# Patient Record
Sex: MALE | Race: WHITE | NOT HISPANIC OR LATINO
[De-identification: names, ages, dates, MRNs, and addresses within clinical notes are randomized per-mention and may not be internally consistent; named-entity substitution may affect disease eponyms.]

---

## 2017-01-06 ENCOUNTER — OTHER (OUTPATIENT)
Age: 67
End: 2017-01-06

## 2017-01-07 ENCOUNTER — APPOINTMENT (OUTPATIENT)
Dept: ORTHOPEDIC SURGERY | Facility: CLINIC | Age: 67
End: 2017-01-07

## 2017-01-07 VITALS
TEMPERATURE: 98.2 F | SYSTOLIC BLOOD PRESSURE: 146 MMHG | BODY MASS INDEX: 44.38 KG/M2 | HEART RATE: 8 BPM | WEIGHT: 310 LBS | HEIGHT: 70 IN | DIASTOLIC BLOOD PRESSURE: 82 MMHG

## 2017-01-20 ENCOUNTER — APPOINTMENT (OUTPATIENT)
Dept: ORTHOPEDIC SURGERY | Facility: CLINIC | Age: 67
End: 2017-01-20

## 2017-02-13 ENCOUNTER — APPOINTMENT (OUTPATIENT)
Dept: ORTHOPEDIC SURGERY | Facility: CLINIC | Age: 67
End: 2017-02-13

## 2017-02-13 DIAGNOSIS — Z47.1 AFTERCARE FOLLOWING JOINT REPLACEMENT SURGERY: ICD-10-CM

## 2017-02-13 DIAGNOSIS — Z96.651 PRESENCE OF RIGHT ARTIFICIAL KNEE JOINT: ICD-10-CM

## 2018-04-11 ENCOUNTER — APPOINTMENT (OUTPATIENT)
Dept: SURGICAL ONCOLOGY | Facility: CLINIC | Age: 68
End: 2018-04-11
Payer: COMMERCIAL

## 2018-04-11 VITALS
SYSTOLIC BLOOD PRESSURE: 145 MMHG | HEIGHT: 70 IN | BODY MASS INDEX: 45.1 KG/M2 | DIASTOLIC BLOOD PRESSURE: 80 MMHG | WEIGHT: 315 LBS | HEART RATE: 92 BPM | RESPIRATION RATE: 98 BRPM

## 2018-04-11 DIAGNOSIS — Z86.73 PERSONAL HISTORY OF TRANSIENT ISCHEMIC ATTACK (TIA), AND CEREBRAL INFARCTION W/OUT RESIDUAL DEFICITS: ICD-10-CM

## 2018-04-11 DIAGNOSIS — Z87.09 PERSONAL HISTORY OF OTHER DISEASES OF THE RESPIRATORY SYSTEM: ICD-10-CM

## 2018-04-11 PROCEDURE — 99245 OFF/OP CONSLTJ NEW/EST HI 55: CPT

## 2018-04-11 RX ORDER — ALBUTEROL SULFATE 90 UG/1
INHALANT RESPIRATORY (INHALATION)
Refills: 0 | Status: ACTIVE | COMMUNITY

## 2018-04-11 RX ORDER — FLUTICASONE FUROATE AND VILANTEROL TRIFENATATE 200; 25 UG/1; UG/1
200-25 POWDER RESPIRATORY (INHALATION)
Refills: 0 | Status: ACTIVE | COMMUNITY

## 2018-04-17 ENCOUNTER — FORM ENCOUNTER (OUTPATIENT)
Age: 68
End: 2018-04-17

## 2018-04-18 ENCOUNTER — OUTPATIENT (OUTPATIENT)
Dept: OUTPATIENT SERVICES | Facility: HOSPITAL | Age: 68
LOS: 1 days | End: 2018-04-18

## 2018-04-18 ENCOUNTER — APPOINTMENT (OUTPATIENT)
Dept: MRI IMAGING | Facility: CLINIC | Age: 68
End: 2018-04-18
Payer: COMMERCIAL

## 2018-04-18 DIAGNOSIS — K76.9 LIVER DISEASE, UNSPECIFIED: ICD-10-CM

## 2018-04-18 PROCEDURE — 74183 MRI ABD W/O CNTR FLWD CNTR: CPT | Mod: 26

## 2018-04-24 PROBLEM — K76.9 HEPATIC LESION: Status: ACTIVE | Noted: 2018-04-11

## 2018-04-25 ENCOUNTER — APPOINTMENT (OUTPATIENT)
Dept: SURGICAL ONCOLOGY | Facility: CLINIC | Age: 68
End: 2018-04-25
Payer: COMMERCIAL

## 2018-04-25 VITALS
SYSTOLIC BLOOD PRESSURE: 133 MMHG | HEART RATE: 105 BPM | TEMPERATURE: 98.1 F | BODY MASS INDEX: 44.52 KG/M2 | DIASTOLIC BLOOD PRESSURE: 75 MMHG | WEIGHT: 311 LBS | OXYGEN SATURATION: 98 % | HEIGHT: 70 IN

## 2018-04-25 DIAGNOSIS — K76.9 LIVER DISEASE, UNSPECIFIED: ICD-10-CM

## 2018-04-25 PROCEDURE — 99215 OFFICE O/P EST HI 40 MIN: CPT

## 2018-04-30 ENCOUNTER — OUTPATIENT (OUTPATIENT)
Dept: OUTPATIENT SERVICES | Facility: HOSPITAL | Age: 68
LOS: 1 days | End: 2018-04-30
Payer: COMMERCIAL

## 2018-04-30 VITALS
HEIGHT: 70 IN | HEART RATE: 88 BPM | RESPIRATION RATE: 16 BRPM | DIASTOLIC BLOOD PRESSURE: 79 MMHG | WEIGHT: 315 LBS | TEMPERATURE: 98 F | SYSTOLIC BLOOD PRESSURE: 133 MMHG

## 2018-04-30 DIAGNOSIS — Z01.818 ENCOUNTER FOR OTHER PREPROCEDURAL EXAMINATION: ICD-10-CM

## 2018-04-30 DIAGNOSIS — I10 ESSENTIAL (PRIMARY) HYPERTENSION: ICD-10-CM

## 2018-04-30 DIAGNOSIS — Z29.9 ENCOUNTER FOR PROPHYLACTIC MEASURES, UNSPECIFIED: ICD-10-CM

## 2018-04-30 DIAGNOSIS — K76.9 LIVER DISEASE, UNSPECIFIED: ICD-10-CM

## 2018-04-30 DIAGNOSIS — Z98.890 OTHER SPECIFIED POSTPROCEDURAL STATES: Chronic | ICD-10-CM

## 2018-04-30 DIAGNOSIS — E11.9 TYPE 2 DIABETES MELLITUS WITHOUT COMPLICATIONS: ICD-10-CM

## 2018-04-30 LAB
ANION GAP SERPL CALC-SCNC: 13 MMOL/L — SIGNIFICANT CHANGE UP (ref 5–17)
APTT BLD: 32.5 SEC — SIGNIFICANT CHANGE UP (ref 27.5–37.4)
BLD GP AB SCN SERPL QL: SIGNIFICANT CHANGE UP
BUN SERPL-MCNC: 22 MG/DL — HIGH (ref 8–20)
CALCIUM SERPL-MCNC: 9 MG/DL — SIGNIFICANT CHANGE UP (ref 8.6–10.2)
CHLORIDE SERPL-SCNC: 104 MMOL/L — SIGNIFICANT CHANGE UP (ref 98–107)
CO2 SERPL-SCNC: 26 MMOL/L — SIGNIFICANT CHANGE UP (ref 22–29)
CREAT SERPL-MCNC: 0.92 MG/DL — SIGNIFICANT CHANGE UP (ref 0.5–1.3)
GLUCOSE SERPL-MCNC: 115 MG/DL — SIGNIFICANT CHANGE UP (ref 70–115)
HBA1C BLD-MCNC: 6.4 % — HIGH (ref 4–5.6)
HCT VFR BLD CALC: 36.6 % — LOW (ref 42–52)
HGB BLD-MCNC: 11.8 G/DL — LOW (ref 14–18)
INR BLD: 1.04 RATIO — SIGNIFICANT CHANGE UP (ref 0.88–1.16)
MCHC RBC-ENTMCNC: 27.2 PG — SIGNIFICANT CHANGE UP (ref 27–31)
MCHC RBC-ENTMCNC: 32.2 G/DL — SIGNIFICANT CHANGE UP (ref 32–36)
MCV RBC AUTO: 84.3 FL — SIGNIFICANT CHANGE UP (ref 80–94)
PLATELET # BLD AUTO: 91 K/UL — LOW (ref 150–400)
POTASSIUM SERPL-MCNC: 4.6 MMOL/L — SIGNIFICANT CHANGE UP (ref 3.5–5.3)
POTASSIUM SERPL-SCNC: 4.6 MMOL/L — SIGNIFICANT CHANGE UP (ref 3.5–5.3)
PROTHROM AB SERPL-ACNC: 11.4 SEC — SIGNIFICANT CHANGE UP (ref 9.8–12.7)
RBC # BLD: 4.34 M/UL — LOW (ref 4.6–6.2)
RBC # FLD: 15.8 % — HIGH (ref 11–15.6)
SODIUM SERPL-SCNC: 143 MMOL/L — SIGNIFICANT CHANGE UP (ref 135–145)
TYPE + AB SCN PNL BLD: SIGNIFICANT CHANGE UP
WBC # BLD: 2.9 K/UL — LOW (ref 4.8–10.8)
WBC # FLD AUTO: 2.9 K/UL — LOW (ref 4.8–10.8)

## 2018-04-30 PROCEDURE — 86901 BLOOD TYPING SEROLOGIC RH(D): CPT

## 2018-04-30 PROCEDURE — 85027 COMPLETE CBC AUTOMATED: CPT

## 2018-04-30 PROCEDURE — G0463: CPT

## 2018-04-30 PROCEDURE — 36415 COLL VENOUS BLD VENIPUNCTURE: CPT

## 2018-04-30 PROCEDURE — 83036 HEMOGLOBIN GLYCOSYLATED A1C: CPT

## 2018-04-30 PROCEDURE — 86850 RBC ANTIBODY SCREEN: CPT

## 2018-04-30 PROCEDURE — 86900 BLOOD TYPING SEROLOGIC ABO: CPT

## 2018-04-30 PROCEDURE — 93005 ELECTROCARDIOGRAM TRACING: CPT

## 2018-04-30 PROCEDURE — 85730 THROMBOPLASTIN TIME PARTIAL: CPT

## 2018-04-30 PROCEDURE — 80048 BASIC METABOLIC PNL TOTAL CA: CPT

## 2018-04-30 PROCEDURE — 93010 ELECTROCARDIOGRAM REPORT: CPT

## 2018-04-30 PROCEDURE — 85610 PROTHROMBIN TIME: CPT

## 2018-04-30 NOTE — H&P PST ADULT - FAMILY HISTORY
Mother  Still living? Unknown  Family history of brain aneurysm, Age at diagnosis: Age Unknown     Father  Still living? No  Family history of diabetes mellitus, Age at diagnosis: Age Unknown

## 2018-04-30 NOTE — H&P PST ADULT - HISTORY OF PRESENT ILLNESS
This is a 67 y.o male who presents to PST today.  The pt reports he has a history of Hepatitis C and was being evaluated by Dr. Ng who specializes in this illness.  He underwent an abdominal cat scan which demonstrated a mass on the liver.  He is now scheduled for resection of same in the near future.
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
## 2018-04-30 NOTE — H&P PST ADULT - PSH
H/O right knee surgery  Right knee total joint replacement 3/2015  4/2015 Arthroscopic right knee surgery  7/2015 removal of artificial joint and spacer placement  History of total left knee replacement    Previous back surgery    S/P arthroscopy of left knee    S/P eye surgery  Laser  S/P eye surgery  laser  S/P hernia surgery  umbilical  S/P knee replacement  Rt  S/P rotator cuff surgery  R shoulder  S/P shoulder surgery  x 2

## 2018-04-30 NOTE — PATIENT PROFILE ADULT. - ABILITY TO HEAR (WITH HEARING AID OR HEARING APPLIANCE IF NORMALLY USED):
hearing aides both ears/Mildly to Moderately Impaired: difficulty hearing in some environments or speaker may need to increase volume or speak distinctly

## 2018-04-30 NOTE — H&P PST ADULT - ASSESSMENT
CAPRINI SCORE [CLOT]    AGE RELATED RISK FACTORS                                                       MOBILITY RELATED FACTORS  [ ] Age 41-60 years                                            (1 Point)                  [ ] Bed rest                                                        (1 Point)  [x] Age: 61-74 years                                           (2 Points)                 [ ] Plaster cast                                                   (2 Points)  [ ] Age= 75 years                                              (3 Points)                 [ ] Bed bound for more than 72 hours                 (2 Points)    DISEASE RELATED RISK FACTORS                                               GENDER SPECIFIC FACTORS  [x ] Edema in the lower extremities                       (1 Point)                  [ ] Pregnancy                                                     (1 Point)  [ ] Varicose veins                                               (1 Point)                  [ ] Post-partum < 6 weeks                                   (1 Point)             [x ] BMI > 25 Kg/m2                                            (1 Point)                  [ ] Hormonal therapy  or oral contraception          (1 Point)                 [ ] Sepsis (in the previous month)                        (1 Point)                  [ ] History of pregnancy complications                 (1 point)  [ ] Pneumonia or serious lung disease                                               [ ] Unexplained or recurrent                     (1 Point)           (in the previous month)                               (1 Point)  [ ] Abnormal pulmonary function test                     (1 Point)                 SURGERY RELATED RISK FACTORS  [ ] Acute myocardial infarction                              (1 Point)                 [ ]  Section                                             (1 Point)  [ ] Congestive heart failure (in the previous month)  (1 Point)               [ ] Minor surgery                                                  (1 Point)   [ ] Inflammatory bowel disease                             (1 Point)                 [ ] Arthroscopic surgery                                        (2 Points)  [ ] Central venous access                                      (2 Points)                [x ] General surgery lasting more than 45 minutes   (2 Points)       [ ] Stroke (in the previous month)                          (5 Points)               [ ] Elective arthroplasty                                         (5 Points)                                                                                                                                               HEMATOLOGY RELATED FACTORS                                                 TRAUMA RELATED RISK FACTORS  [ ] Prior episodes of VTE                                     (3 Points)                 [ ] Fracture of the hip, pelvis, or leg                       (5 Points)  [ ] Positive family history for VTE                         (3 Points)                 [ ] Acute spinal cord injury (in the previous month)  (5 Points)  [ ] Prothrombin 26996 A                                     (3 Points)                 [ ] Paralysis  (less than 1 month)                             (5 Points)  [ ] Factor V Leiden                                             (3 Points)                  [ ] Multiple Trauma within 1 month                        (5 Points)  [ ] Lupus anticoagulants                                     (3 Points)                                                           [ ] Anticardiolipin antibodies                               (3 Points)                                                       [ ] High homocysteine in the blood                      (3 Points)                                             [ ] Other congenital or acquired thrombophilia      (3 Points)                                                [ ] Heparin induced thrombocytopenia                  (3 Points)                                          Total Score [    6      ]

## 2018-05-14 ENCOUNTER — APPOINTMENT (OUTPATIENT)
Dept: SURGICAL ONCOLOGY | Facility: HOSPITAL | Age: 68
End: 2018-05-14

## 2018-05-29 ENCOUNTER — OUTPATIENT (OUTPATIENT)
Dept: OUTPATIENT SERVICES | Facility: HOSPITAL | Age: 68
LOS: 1 days | End: 2018-05-29
Payer: COMMERCIAL

## 2018-05-29 VITALS
WEIGHT: 315 LBS | HEART RATE: 80 BPM | RESPIRATION RATE: 20 BRPM | TEMPERATURE: 97 F | SYSTOLIC BLOOD PRESSURE: 142 MMHG | HEIGHT: 69 IN | DIASTOLIC BLOOD PRESSURE: 70 MMHG

## 2018-05-29 DIAGNOSIS — K76.9 LIVER DISEASE, UNSPECIFIED: ICD-10-CM

## 2018-05-29 DIAGNOSIS — E11.9 TYPE 2 DIABETES MELLITUS WITHOUT COMPLICATIONS: ICD-10-CM

## 2018-05-29 DIAGNOSIS — Z29.9 ENCOUNTER FOR PROPHYLACTIC MEASURES, UNSPECIFIED: ICD-10-CM

## 2018-05-29 DIAGNOSIS — I10 ESSENTIAL (PRIMARY) HYPERTENSION: ICD-10-CM

## 2018-05-29 DIAGNOSIS — Z98.890 OTHER SPECIFIED POSTPROCEDURAL STATES: Chronic | ICD-10-CM

## 2018-05-29 DIAGNOSIS — Z01.818 ENCOUNTER FOR OTHER PREPROCEDURAL EXAMINATION: ICD-10-CM

## 2018-05-29 LAB
ALBUMIN SERPL ELPH-MCNC: 4 G/DL — SIGNIFICANT CHANGE UP (ref 3.3–5.2)
ALP SERPL-CCNC: 119 U/L — SIGNIFICANT CHANGE UP (ref 40–120)
ALT FLD-CCNC: 81 U/L — HIGH
ANION GAP SERPL CALC-SCNC: 13 MMOL/L — SIGNIFICANT CHANGE UP (ref 5–17)
APTT BLD: 33.9 SEC — SIGNIFICANT CHANGE UP (ref 27.5–37.4)
AST SERPL-CCNC: 84 U/L — HIGH
BASOPHILS # BLD AUTO: 0 K/UL — SIGNIFICANT CHANGE UP (ref 0–0.2)
BASOPHILS NFR BLD AUTO: 0.2 % — SIGNIFICANT CHANGE UP (ref 0–2)
BILIRUB SERPL-MCNC: 0.5 MG/DL — SIGNIFICANT CHANGE UP (ref 0.4–2)
BLD GP AB SCN SERPL QL: SIGNIFICANT CHANGE UP
BUN SERPL-MCNC: 22 MG/DL — HIGH (ref 8–20)
CALCIUM SERPL-MCNC: 9.6 MG/DL — SIGNIFICANT CHANGE UP (ref 8.6–10.2)
CHLORIDE SERPL-SCNC: 102 MMOL/L — SIGNIFICANT CHANGE UP (ref 98–107)
CO2 SERPL-SCNC: 25 MMOL/L — SIGNIFICANT CHANGE UP (ref 22–29)
CREAT SERPL-MCNC: 0.9 MG/DL — SIGNIFICANT CHANGE UP (ref 0.5–1.3)
EOSINOPHIL # BLD AUTO: 0.1 K/UL — SIGNIFICANT CHANGE UP (ref 0–0.5)
EOSINOPHIL NFR BLD AUTO: 2.4 % — SIGNIFICANT CHANGE UP (ref 0–6)
GLUCOSE SERPL-MCNC: 156 MG/DL — HIGH (ref 70–115)
HCT VFR BLD CALC: 38.3 % — LOW (ref 42–52)
HGB BLD-MCNC: 12.3 G/DL — LOW (ref 14–18)
INR BLD: 1.06 RATIO — SIGNIFICANT CHANGE UP (ref 0.88–1.16)
LYMPHOCYTES # BLD AUTO: 0.7 K/UL — LOW (ref 1–4.8)
LYMPHOCYTES # BLD AUTO: 16.7 % — LOW (ref 20–55)
MCHC RBC-ENTMCNC: 27 PG — SIGNIFICANT CHANGE UP (ref 27–31)
MCHC RBC-ENTMCNC: 32.1 G/DL — SIGNIFICANT CHANGE UP (ref 32–36)
MCV RBC AUTO: 84.2 FL — SIGNIFICANT CHANGE UP (ref 80–94)
MONOCYTES # BLD AUTO: 0.3 K/UL — SIGNIFICANT CHANGE UP (ref 0–0.8)
MONOCYTES NFR BLD AUTO: 6.6 % — SIGNIFICANT CHANGE UP (ref 3–10)
NEUTROPHILS # BLD AUTO: 3 K/UL — SIGNIFICANT CHANGE UP (ref 1.8–8)
NEUTROPHILS NFR BLD AUTO: 73.9 % — HIGH (ref 37–73)
PLATELET # BLD AUTO: 102 K/UL — LOW (ref 150–400)
POTASSIUM SERPL-MCNC: 4.9 MMOL/L — SIGNIFICANT CHANGE UP (ref 3.5–5.3)
POTASSIUM SERPL-SCNC: 4.9 MMOL/L — SIGNIFICANT CHANGE UP (ref 3.5–5.3)
PROT SERPL-MCNC: 7.2 G/DL — SIGNIFICANT CHANGE UP (ref 6.6–8.7)
PROTHROM AB SERPL-ACNC: 11.7 SEC — SIGNIFICANT CHANGE UP (ref 9.8–12.7)
RBC # BLD: 4.55 M/UL — LOW (ref 4.6–6.2)
RBC # FLD: 16 % — HIGH (ref 11–15.6)
SODIUM SERPL-SCNC: 140 MMOL/L — SIGNIFICANT CHANGE UP (ref 135–145)
TYPE + AB SCN PNL BLD: SIGNIFICANT CHANGE UP
WBC # BLD: 4.1 K/UL — LOW (ref 4.8–10.8)
WBC # FLD AUTO: 4.1 K/UL — LOW (ref 4.8–10.8)

## 2018-05-29 RX ORDER — SODIUM CHLORIDE 9 MG/ML
3 INJECTION INTRAMUSCULAR; INTRAVENOUS; SUBCUTANEOUS EVERY 8 HOURS
Qty: 0 | Refills: 0 | Status: DISCONTINUED | OUTPATIENT
Start: 2018-06-11 | End: 2018-06-14

## 2018-05-29 RX ORDER — FLUTICASONE FUROATE AND VILANTEROL TRIFENATATE 100; 25 UG/1; UG/1
0 POWDER RESPIRATORY (INHALATION)
Qty: 0 | Refills: 0 | COMMUNITY

## 2018-05-29 NOTE — PATIENT PROFILE ADULT. - FAMILY HISTORY
Mother  Still living? Unknown  Family history of brain aneurysm, Age at diagnosis: Age Unknown     Father  Still living? Unknown  Family history of diabetes mellitus, Age at diagnosis: Age Unknown

## 2018-05-29 NOTE — PATIENT PROFILE ADULT. - PSH
H/O right knee surgery  Right knee total joint replacement 3/2015  4/2015 Arthroscopic right knee surgery  7/2015 removal of artificial joint and spacer placement  History of total left knee replacement    S/P arthroscopy of left knee    S/P eye surgery  laser  S/P hernia surgery  umbilical  S/P knee replacement  Rt  S/P rotator cuff surgery  R shoulder  S/P shoulder surgery  x 2
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
## 2018-05-29 NOTE — H&P PST ADULT - ASSESSMENT
67 year old male with h/o HTN, IDDM, Hep C, Cirrhosis of liver present with c/o liver mass and is schedule for a Robotic Resection Liver Mass, Possible Open  CAPRINI SCORE [CLOT]    AGE RELATED RISK FACTORS                                                       MOBILITY RELATED FACTORS  [ ] Age 41-60 years                                            (1 Point)                  [ ] Bed rest                                                        (1 Point)  [x ] Age: 61-74 years                                           (2 Points)                 [ ] Plaster cast                                                   (2 Points)  [ ] Age= 75 years                                              (3 Points)                 [ ] Bed bound for more than 72 hours                 (2 Points)    DISEASE RELATED RISK FACTORS                                               GENDER SPECIFIC FACTORS  [ x] Edema in the lower extremities                       (1 Point)                  [ ] Pregnancy                                                     (1 Point)  [ x] Varicose veins                                               (1 Point)                  [ ] Post-partum < 6 weeks                                   (1 Point)             [x ] BMI > 25 Kg/m2                                            (1 Point)                  [ ] Hormonal therapy  or oral contraception          (1 Point)                 [ ] Sepsis (in the previous month)                        (1 Point)                  [ ] History of pregnancy complications                 (1 point)  [ ] Pneumonia or serious lung disease                                               [ ] Unexplained or recurrent                     (1 Point)           (in the previous month)                               (1 Point)  [ ] Abnormal pulmonary function test                     (1 Point)                 SURGERY RELATED RISK FACTORS  [ ] Acute myocardial infarction                              (1 Point)                 [ ]  Section                                             (1 Point)  [ ] Congestive heart failure (in the previous month)  (1 Point)               [ ] Minor surgery                                                  (1 Point)   [ ] Inflammatory bowel disease                             (1 Point)                 [ ] Arthroscopic surgery                                        (2 Points)  [ ] Central venous access                                      (2 Points)                [x ] General surgery lasting more than 45 minutes   (2 Points)       [ ] Stroke (in the previous month)                          (5 Points)               [ ] Elective arthroplasty                                         (5 Points)                                                                                                                                               HEMATOLOGY RELATED FACTORS                                                 TRAUMA RELATED RISK FACTORS  [ ] Prior episodes of VTE                                     (3 Points)                 [ ] Fracture of the hip, pelvis, or leg                       (5 Points)  [ ] Positive family history for VTE                         (3 Points)                 [ ] Acute spinal cord injury (in the previous month)  (5 Points)  [ ] Prothrombin 95102 A                                     (3 Points)                 [ ] Paralysis  (less than 1 month)                             (5 Points)  [ ] Factor V Leiden                                             (3 Points)                  [ ] Multiple Trauma within 1 month                        (5 Points)  [ ] Lupus anticoagulants                                     (3 Points)                                                           [ ] Anticardiolipin antibodies                               (3 Points)                                                       [ ] High homocysteine in the blood                      (3 Points)                                             [ ] Other congenital or acquired thrombophilia      (3 Points)                                                [ ] Heparin induced thrombocytopenia                  (3 Points)                                          Total Score [  7]

## 2018-06-10 ENCOUNTER — TRANSCRIPTION ENCOUNTER (OUTPATIENT)
Age: 68
End: 2018-06-10

## 2018-06-11 ENCOUNTER — INPATIENT (INPATIENT)
Facility: HOSPITAL | Age: 68
LOS: 2 days | Discharge: HOME | DRG: 421 | End: 2018-06-14
Attending: SPECIALIST | Admitting: SPECIALIST
Payer: COMMERCIAL

## 2018-06-11 ENCOUNTER — APPOINTMENT (OUTPATIENT)
Dept: SURGICAL ONCOLOGY | Facility: HOSPITAL | Age: 68
End: 2018-06-11

## 2018-06-11 ENCOUNTER — RESULT REVIEW (OUTPATIENT)
Age: 68
End: 2018-06-11

## 2018-06-11 VITALS
OXYGEN SATURATION: 96 % | TEMPERATURE: 97 F | HEIGHT: 69 IN | RESPIRATION RATE: 16 BRPM | SYSTOLIC BLOOD PRESSURE: 105 MMHG | HEART RATE: 88 BPM | WEIGHT: 315 LBS | DIASTOLIC BLOOD PRESSURE: 55 MMHG

## 2018-06-11 DIAGNOSIS — K76.9 LIVER DISEASE, UNSPECIFIED: ICD-10-CM

## 2018-06-11 DIAGNOSIS — Z98.890 OTHER SPECIFIED POSTPROCEDURAL STATES: Chronic | ICD-10-CM

## 2018-06-11 LAB
AMMONIA BLD-MCNC: 32 UMOL/L — SIGNIFICANT CHANGE UP (ref 11–55)
GLUCOSE BLDC GLUCOMTR-MCNC: 186 MG/DL — HIGH (ref 70–99)
GLUCOSE BLDC GLUCOMTR-MCNC: 215 MG/DL — HIGH (ref 70–99)
GLUCOSE BLDC GLUCOMTR-MCNC: 298 MG/DL — HIGH (ref 70–99)
HCT VFR BLD CALC: 35.2 % — LOW (ref 42–52)
HGB BLD-MCNC: 11.3 G/DL — LOW (ref 14–18)
MCHC RBC-ENTMCNC: 26.8 PG — LOW (ref 27–31)
MCHC RBC-ENTMCNC: 32.1 G/DL — SIGNIFICANT CHANGE UP (ref 32–36)
MCV RBC AUTO: 83.4 FL — SIGNIFICANT CHANGE UP (ref 80–94)
PLATELET # BLD AUTO: 94 K/UL — LOW (ref 150–400)
RBC # BLD: 4.22 M/UL — LOW (ref 4.6–6.2)
RBC # FLD: 15.7 % — HIGH (ref 11–15.6)
WBC # BLD: 4 K/UL — LOW (ref 4.8–10.8)
WBC # FLD AUTO: 4 K/UL — LOW (ref 4.8–10.8)

## 2018-06-11 PROCEDURE — 86850 RBC ANTIBODY SCREEN: CPT

## 2018-06-11 PROCEDURE — 86901 BLOOD TYPING SEROLOGIC RH(D): CPT

## 2018-06-11 PROCEDURE — 88307 TISSUE EXAM BY PATHOLOGIST: CPT | Mod: 26

## 2018-06-11 PROCEDURE — 36415 COLL VENOUS BLD VENIPUNCTURE: CPT

## 2018-06-11 PROCEDURE — 85730 THROMBOPLASTIN TIME PARTIAL: CPT

## 2018-06-11 PROCEDURE — 88331 PATH CONSLTJ SURG 1 BLK 1SPC: CPT | Mod: 26

## 2018-06-11 PROCEDURE — 47120 PARTIAL REMOVAL OF LIVER: CPT | Mod: 82

## 2018-06-11 PROCEDURE — 51702 INSERT TEMP BLADDER CATH: CPT | Mod: 59

## 2018-06-11 PROCEDURE — 85610 PROTHROMBIN TIME: CPT

## 2018-06-11 PROCEDURE — 86923 COMPATIBILITY TEST ELECTRIC: CPT

## 2018-06-11 PROCEDURE — G0463: CPT

## 2018-06-11 PROCEDURE — 47120 PARTIAL REMOVAL OF LIVER: CPT

## 2018-06-11 PROCEDURE — 85027 COMPLETE CBC AUTOMATED: CPT

## 2018-06-11 PROCEDURE — 80053 COMPREHEN METABOLIC PANEL: CPT

## 2018-06-11 PROCEDURE — 86900 BLOOD TYPING SEROLOGIC ABO: CPT

## 2018-06-11 RX ORDER — HYDROMORPHONE HYDROCHLORIDE 2 MG/ML
30 INJECTION INTRAMUSCULAR; INTRAVENOUS; SUBCUTANEOUS
Qty: 0 | Refills: 0 | Status: DISCONTINUED | OUTPATIENT
Start: 2018-06-11 | End: 2018-06-12

## 2018-06-11 RX ORDER — METOPROLOL TARTRATE 50 MG
50 TABLET ORAL
Qty: 0 | Refills: 0 | Status: DISCONTINUED | OUTPATIENT
Start: 2018-06-11 | End: 2018-06-14

## 2018-06-11 RX ORDER — INSULIN HUMAN 100 [IU]/ML
0 INJECTION, SOLUTION SUBCUTANEOUS
Qty: 0 | Refills: 0 | COMMUNITY

## 2018-06-11 RX ORDER — SODIUM CHLORIDE 9 MG/ML
1000 INJECTION, SOLUTION INTRAVENOUS
Qty: 0 | Refills: 0 | Status: DISCONTINUED | OUTPATIENT
Start: 2018-06-11 | End: 2018-06-11

## 2018-06-11 RX ORDER — LOSARTAN POTASSIUM 100 MG/1
100 TABLET, FILM COATED ORAL DAILY
Qty: 0 | Refills: 0 | Status: DISCONTINUED | OUTPATIENT
Start: 2018-06-11 | End: 2018-06-14

## 2018-06-11 RX ORDER — LOSARTAN POTASSIUM 100 MG/1
1 TABLET, FILM COATED ORAL
Qty: 0 | Refills: 0 | COMMUNITY

## 2018-06-11 RX ORDER — ONDANSETRON 8 MG/1
4 TABLET, FILM COATED ORAL EVERY 6 HOURS
Qty: 0 | Refills: 0 | Status: DISCONTINUED | OUTPATIENT
Start: 2018-06-11 | End: 2018-06-14

## 2018-06-11 RX ORDER — TAMSULOSIN HYDROCHLORIDE 0.4 MG/1
0.4 CAPSULE ORAL AT BEDTIME
Qty: 0 | Refills: 0 | Status: DISCONTINUED | OUTPATIENT
Start: 2018-06-11 | End: 2018-06-14

## 2018-06-11 RX ORDER — LIRAGLUTIDE 6 MG/ML
0 INJECTION SUBCUTANEOUS
Qty: 0 | Refills: 0 | COMMUNITY

## 2018-06-11 RX ORDER — ASPIRIN/CALCIUM CARB/MAGNESIUM 324 MG
1 TABLET ORAL
Qty: 0 | Refills: 0 | COMMUNITY

## 2018-06-11 RX ORDER — AMITRIPTYLINE HCL 25 MG
1 TABLET ORAL
Qty: 0 | Refills: 0 | COMMUNITY

## 2018-06-11 RX ORDER — NALOXONE HYDROCHLORIDE 4 MG/.1ML
0.1 SPRAY NASAL
Qty: 0 | Refills: 0 | Status: DISCONTINUED | OUTPATIENT
Start: 2018-06-11 | End: 2018-06-14

## 2018-06-11 RX ORDER — CEFAZOLIN SODIUM 1 G
2000 VIAL (EA) INJECTION ONCE
Qty: 0 | Refills: 0 | Status: COMPLETED | OUTPATIENT
Start: 2018-06-11 | End: 2018-06-11

## 2018-06-11 RX ORDER — AMITRIPTYLINE HCL 25 MG
100 TABLET ORAL
Qty: 0 | Refills: 0 | Status: DISCONTINUED | OUTPATIENT
Start: 2018-06-11 | End: 2018-06-14

## 2018-06-11 RX ORDER — SODIUM CHLORIDE 9 MG/ML
1000 INJECTION, SOLUTION INTRAVENOUS
Qty: 0 | Refills: 0 | Status: DISCONTINUED | OUTPATIENT
Start: 2018-06-11 | End: 2018-06-12

## 2018-06-11 RX ORDER — SERTRALINE 25 MG/1
25 TABLET, FILM COATED ORAL DAILY
Qty: 0 | Refills: 0 | Status: DISCONTINUED | OUTPATIENT
Start: 2018-06-11 | End: 2018-06-14

## 2018-06-11 RX ORDER — LACTULOSE 10 G/15ML
15 SOLUTION ORAL
Qty: 0 | Refills: 0 | COMMUNITY

## 2018-06-11 RX ORDER — HYDROMORPHONE HYDROCHLORIDE 2 MG/ML
0.5 INJECTION INTRAMUSCULAR; INTRAVENOUS; SUBCUTANEOUS
Qty: 0 | Refills: 0 | Status: DISCONTINUED | OUTPATIENT
Start: 2018-06-11 | End: 2018-06-11

## 2018-06-11 RX ORDER — INSULIN LISPRO 100/ML
VIAL (ML) SUBCUTANEOUS EVERY 4 HOURS
Qty: 0 | Refills: 0 | Status: DISCONTINUED | OUTPATIENT
Start: 2018-06-11 | End: 2018-06-12

## 2018-06-11 RX ORDER — CEFAZOLIN SODIUM 1 G
3000 VIAL (EA) INJECTION EVERY 8 HOURS
Qty: 0 | Refills: 0 | Status: COMPLETED | OUTPATIENT
Start: 2018-06-11 | End: 2018-06-12

## 2018-06-11 RX ORDER — DOCUSATE SODIUM 100 MG
100 CAPSULE ORAL THREE TIMES A DAY
Qty: 0 | Refills: 0 | Status: DISCONTINUED | OUTPATIENT
Start: 2018-06-11 | End: 2018-06-14

## 2018-06-11 RX ORDER — AMLODIPINE BESYLATE 2.5 MG/1
5 TABLET ORAL DAILY
Qty: 0 | Refills: 0 | Status: DISCONTINUED | OUTPATIENT
Start: 2018-06-11 | End: 2018-06-14

## 2018-06-11 RX ORDER — HEPARIN SODIUM 5000 [USP'U]/ML
5000 INJECTION INTRAVENOUS; SUBCUTANEOUS EVERY 8 HOURS
Qty: 0 | Refills: 0 | Status: DISCONTINUED | OUTPATIENT
Start: 2018-06-11 | End: 2018-06-14

## 2018-06-11 RX ADMIN — HYDROMORPHONE HYDROCHLORIDE 30 MILLILITER(S): 2 INJECTION INTRAMUSCULAR; INTRAVENOUS; SUBCUTANEOUS at 17:51

## 2018-06-11 RX ADMIN — TAMSULOSIN HYDROCHLORIDE 0.4 MILLIGRAM(S): 0.4 CAPSULE ORAL at 22:23

## 2018-06-11 RX ADMIN — Medication 100 MILLIGRAM(S): at 22:24

## 2018-06-11 RX ADMIN — Medication 100 MILLIGRAM(S): at 14:00

## 2018-06-11 RX ADMIN — Medication 200 MILLIGRAM(S): at 20:46

## 2018-06-11 RX ADMIN — SODIUM CHLORIDE 3 MILLILITER(S): 9 INJECTION INTRAMUSCULAR; INTRAVENOUS; SUBCUTANEOUS at 22:23

## 2018-06-11 RX ADMIN — HEPARIN SODIUM 5000 UNIT(S): 5000 INJECTION INTRAVENOUS; SUBCUTANEOUS at 20:47

## 2018-06-11 RX ADMIN — HYDROMORPHONE HYDROCHLORIDE 30 MILLILITER(S): 2 INJECTION INTRAMUSCULAR; INTRAVENOUS; SUBCUTANEOUS at 21:24

## 2018-06-11 NOTE — BRIEF OPERATIVE NOTE - OPERATION/FINDINGS
attempted robotic liver resection. Upon visualization with camera unable to clearly identify mass. Multiple areas of adhesions from prior surgery. converted to open liver resection. nodule visualized and confirmed with intraop US. resection performed and nodle sent frozen section. Hemostasis achieve. fascia closed with loop PDS and skin closed with staples

## 2018-06-11 NOTE — BRIEF OPERATIVE NOTE - PROCEDURE
<<-----Click on this checkbox to enter Procedure Diagnostic laparoscopy  06/11/2018    Active  ROSE  Liver resection  06/11/2018    Active  ROSE

## 2018-06-12 LAB
AMMONIA BLD-MCNC: 29 UMOL/L — SIGNIFICANT CHANGE UP (ref 11–55)
ANION GAP SERPL CALC-SCNC: 14 MMOL/L — SIGNIFICANT CHANGE UP (ref 5–17)
ANISOCYTOSIS BLD QL: SLIGHT — SIGNIFICANT CHANGE UP
BUN SERPL-MCNC: 23 MG/DL — HIGH (ref 8–20)
CALCIUM SERPL-MCNC: 8 MG/DL — LOW (ref 8.6–10.2)
CHLORIDE SERPL-SCNC: 99 MMOL/L — SIGNIFICANT CHANGE UP (ref 98–107)
CO2 SERPL-SCNC: 21 MMOL/L — LOW (ref 22–29)
CREAT SERPL-MCNC: 1.02 MG/DL — SIGNIFICANT CHANGE UP (ref 0.5–1.3)
GLUCOSE BLDC GLUCOMTR-MCNC: 109 MG/DL — HIGH (ref 70–99)
GLUCOSE BLDC GLUCOMTR-MCNC: 170 MG/DL — HIGH (ref 70–99)
GLUCOSE BLDC GLUCOMTR-MCNC: 282 MG/DL — HIGH (ref 70–99)
GLUCOSE BLDC GLUCOMTR-MCNC: 324 MG/DL — HIGH (ref 70–99)
GLUCOSE SERPL-MCNC: 302 MG/DL — HIGH (ref 70–115)
HCT VFR BLD CALC: 33 % — LOW (ref 42–52)
HGB BLD-MCNC: 10.6 G/DL — LOW (ref 14–18)
HYPOCHROMIA BLD QL: SLIGHT — SIGNIFICANT CHANGE UP
LYMPHOCYTES # BLD AUTO: 7 % — LOW (ref 20–55)
MACROCYTES BLD QL: SLIGHT — SIGNIFICANT CHANGE UP
MAGNESIUM SERPL-MCNC: 1.7 MG/DL — SIGNIFICANT CHANGE UP (ref 1.6–2.6)
MCHC RBC-ENTMCNC: 26.8 PG — LOW (ref 27–31)
MCHC RBC-ENTMCNC: 32.1 G/DL — SIGNIFICANT CHANGE UP (ref 32–36)
MCV RBC AUTO: 83.3 FL — SIGNIFICANT CHANGE UP (ref 80–94)
MICROCYTES BLD QL: SLIGHT — SIGNIFICANT CHANGE UP
MONOCYTES NFR BLD AUTO: 5 % — SIGNIFICANT CHANGE UP (ref 3–10)
MYELOCYTES NFR BLD: 1 % — HIGH (ref 0–0)
NEUTROPHILS NFR BLD AUTO: 87 % — HIGH (ref 37–73)
PHOSPHATE SERPL-MCNC: 3 MG/DL — SIGNIFICANT CHANGE UP (ref 2.4–4.7)
PLAT MORPH BLD: NORMAL — SIGNIFICANT CHANGE UP
PLATELET # BLD AUTO: 85 K/UL — LOW (ref 150–400)
POIKILOCYTOSIS BLD QL AUTO: SLIGHT — SIGNIFICANT CHANGE UP
POTASSIUM SERPL-MCNC: 4.7 MMOL/L — SIGNIFICANT CHANGE UP (ref 3.5–5.3)
POTASSIUM SERPL-SCNC: 4.7 MMOL/L — SIGNIFICANT CHANGE UP (ref 3.5–5.3)
RBC # BLD: 3.96 M/UL — LOW (ref 4.6–6.2)
RBC # FLD: 15.4 % — SIGNIFICANT CHANGE UP (ref 11–15.6)
RBC BLD AUTO: ABNORMAL
SODIUM SERPL-SCNC: 134 MMOL/L — LOW (ref 135–145)
WBC # BLD: 4.2 K/UL — LOW (ref 4.8–10.8)
WBC # FLD AUTO: 4.2 K/UL — LOW (ref 4.8–10.8)

## 2018-06-12 PROCEDURE — 47120 PARTIAL REMOVAL OF LIVER: CPT | Mod: AS

## 2018-06-12 RX ORDER — HYDROMORPHONE HYDROCHLORIDE 2 MG/ML
1 INJECTION INTRAMUSCULAR; INTRAVENOUS; SUBCUTANEOUS EVERY 4 HOURS
Qty: 0 | Refills: 0 | Status: DISCONTINUED | OUTPATIENT
Start: 2018-06-12 | End: 2018-06-14

## 2018-06-12 RX ORDER — TAMSULOSIN HYDROCHLORIDE 0.4 MG/1
0.4 CAPSULE ORAL ONCE
Qty: 0 | Refills: 0 | Status: DISCONTINUED | OUTPATIENT
Start: 2018-06-12 | End: 2018-06-12

## 2018-06-12 RX ORDER — SODIUM CHLORIDE 9 MG/ML
1000 INJECTION, SOLUTION INTRAVENOUS
Qty: 0 | Refills: 0 | Status: DISCONTINUED | OUTPATIENT
Start: 2018-06-12 | End: 2018-06-12

## 2018-06-12 RX ORDER — SODIUM CHLORIDE 9 MG/ML
500 INJECTION INTRAMUSCULAR; INTRAVENOUS; SUBCUTANEOUS ONCE
Qty: 0 | Refills: 0 | Status: COMPLETED | OUTPATIENT
Start: 2018-06-12 | End: 2018-06-12

## 2018-06-12 RX ORDER — OXYCODONE HYDROCHLORIDE 5 MG/1
10 TABLET ORAL EVERY 4 HOURS
Qty: 0 | Refills: 0 | Status: DISCONTINUED | OUTPATIENT
Start: 2018-06-12 | End: 2018-06-14

## 2018-06-12 RX ORDER — MAGNESIUM SULFATE 500 MG/ML
2 VIAL (ML) INJECTION ONCE
Qty: 0 | Refills: 0 | Status: COMPLETED | OUTPATIENT
Start: 2018-06-12 | End: 2018-06-12

## 2018-06-12 RX ORDER — INSULIN HUMAN 100 [IU]/ML
1 INJECTION, SOLUTION SUBCUTANEOUS
Qty: 0 | Refills: 0 | Status: DISCONTINUED | OUTPATIENT
Start: 2018-06-12 | End: 2018-06-14

## 2018-06-12 RX ORDER — OXYCODONE HYDROCHLORIDE 5 MG/1
5 TABLET ORAL EVERY 4 HOURS
Qty: 0 | Refills: 0 | Status: DISCONTINUED | OUTPATIENT
Start: 2018-06-12 | End: 2018-06-14

## 2018-06-12 RX ORDER — SODIUM CHLORIDE 9 MG/ML
1000 INJECTION INTRAMUSCULAR; INTRAVENOUS; SUBCUTANEOUS
Qty: 0 | Refills: 0 | Status: DISCONTINUED | OUTPATIENT
Start: 2018-06-12 | End: 2018-06-13

## 2018-06-12 RX ADMIN — LOSARTAN POTASSIUM 100 MILLIGRAM(S): 100 TABLET, FILM COATED ORAL at 06:03

## 2018-06-12 RX ADMIN — TAMSULOSIN HYDROCHLORIDE 0.4 MILLIGRAM(S): 0.4 CAPSULE ORAL at 21:29

## 2018-06-12 RX ADMIN — HEPARIN SODIUM 5000 UNIT(S): 5000 INJECTION INTRAVENOUS; SUBCUTANEOUS at 14:10

## 2018-06-12 RX ADMIN — Medication 50 MILLIGRAM(S): at 18:17

## 2018-06-12 RX ADMIN — Medication 100 MILLIGRAM(S): at 14:08

## 2018-06-12 RX ADMIN — Medication 100 MILLIGRAM(S): at 18:17

## 2018-06-12 RX ADMIN — SERTRALINE 25 MILLIGRAM(S): 25 TABLET, FILM COATED ORAL at 14:08

## 2018-06-12 RX ADMIN — Medication 50 MILLIGRAM(S): at 06:02

## 2018-06-12 RX ADMIN — AMLODIPINE BESYLATE 5 MILLIGRAM(S): 2.5 TABLET ORAL at 06:05

## 2018-06-12 RX ADMIN — SODIUM CHLORIDE 3 MILLILITER(S): 9 INJECTION INTRAMUSCULAR; INTRAVENOUS; SUBCUTANEOUS at 21:27

## 2018-06-12 RX ADMIN — Medication 100 MILLIGRAM(S): at 06:02

## 2018-06-12 RX ADMIN — Medication 50 GRAM(S): at 11:20

## 2018-06-12 RX ADMIN — SODIUM CHLORIDE 1000 MILLILITER(S): 9 INJECTION INTRAMUSCULAR; INTRAVENOUS; SUBCUTANEOUS at 11:18

## 2018-06-12 RX ADMIN — HEPARIN SODIUM 5000 UNIT(S): 5000 INJECTION INTRAVENOUS; SUBCUTANEOUS at 21:29

## 2018-06-12 RX ADMIN — Medication 200 MILLIGRAM(S): at 06:00

## 2018-06-12 RX ADMIN — Medication 100 MILLIGRAM(S): at 21:29

## 2018-06-12 RX ADMIN — HEPARIN SODIUM 5000 UNIT(S): 5000 INJECTION INTRAVENOUS; SUBCUTANEOUS at 06:05

## 2018-06-12 RX ADMIN — Medication 100 MILLIGRAM(S): at 06:01

## 2018-06-12 RX ADMIN — SODIUM CHLORIDE 3 MILLILITER(S): 9 INJECTION INTRAMUSCULAR; INTRAVENOUS; SUBCUTANEOUS at 06:06

## 2018-06-12 RX ADMIN — SODIUM CHLORIDE 3 MILLILITER(S): 9 INJECTION INTRAMUSCULAR; INTRAVENOUS; SUBCUTANEOUS at 14:11

## 2018-06-12 RX ADMIN — SODIUM CHLORIDE 75 MILLILITER(S): 9 INJECTION INTRAMUSCULAR; INTRAVENOUS; SUBCUTANEOUS at 11:20

## 2018-06-12 RX ADMIN — HYDROMORPHONE HYDROCHLORIDE 30 MILLILITER(S): 2 INJECTION INTRAMUSCULAR; INTRAVENOUS; SUBCUTANEOUS at 07:49
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
## 2018-06-12 NOTE — PROGRESS NOTE ADULT - ASSESSMENT
67M s/p diagnostic laparoscopy, liver resection POD 0   Neuro: Pain control as prescribed   Cardio: Monitor Vital signs  Pulm: Incentive spirometry and deep breathing   GI: Diet: NPO; Monitor bowel function; Bowel regimen   : +Harden; Monitor Urine output  MS: Encourage OOB and ambulation  ID: Ancef  DVT ppx: Subq Hep 67M s/p diagnostic laparoscopy, liver resection POD 0   Neuro: Pain control as prescribed   Cardio: Monitor Vital signs  Pulm: Incentive spirometry and deep breathing   GI: Diet: NPO; Monitor bowel function; Bowel regimen   : +Lehman; Monitor Urine output  MS: Encourage OOB and ambulation  ID: Ancef  DVT ppx: Subq Hep    addendum, patient seen and examined this am with attending  no change from above  plan to ADAT   cont with above plan otherwise   will d/c ISS, patient managing on inulin pump and doing self checks with implanted device. lehman to stay and be remove 6/13 due to high risk for retention due to anatomic deformity and bph   will cont with FS by nursing q6

## 2018-06-12 NOTE — PROGRESS NOTE ADULT - SUBJECTIVE AND OBJECTIVE BOX
67M s/p diagnostic laparoscopy, liver resection POD #1  Pt seen and examined at bedside.  Resting comfortably in chair.   Pt doing well on current regimen. No side effects. He is using Dilaudid PCA sparingly.   Reviewed. Pt prescribed Oxycodone 10mg every 6-8hrs PRN  D/C PCA once tolerating PO   Start Oxycodone 5/10mg SS Q4h PRN   Start Dilaudid 1mg IV Q4h PRN BTP   Will f/u   [phone]

## 2018-06-12 NOTE — PROGRESS NOTE ADULT - SUBJECTIVE AND OBJECTIVE BOX
Pt seen, chart reviewed.  S/p Robotic Resection of Liver Mass, POD#1.  VSS.  Adequate pain control with PCA.  Resting comfortably.   Tolerating PO clears.  No N/V.    No anesthesia problems noted.

## 2018-06-12 NOTE — ADVANCED PRACTICE NURSE CONSULT - ASSESSMENT
pt is a+ox3 c/o 0 pain resting comfortably in bed. insulin pump was inserted jun 11 2018 w u 500 insuin, is suspended @ this time. insertion site intact 0 ss of complications @ insertion site noted. pt has pump supply for 3 days.

## 2018-06-12 NOTE — PROGRESS NOTE ADULT - SUBJECTIVE AND OBJECTIVE BOX
Post-op Check    Subjective:  Pt offers no acute complaints at this time. Pain well controlled on current regiment. Denies chest pain and SOB.     STATUS POST:  Diagnostic laparoscopy, liver resection    POST OPERATIVE DAY #: 0    MEDICATIONS  (STANDING):  amitriptyline 100 milliGRAM(s) Oral two times a day  amLODIPine   Tablet 5 milliGRAM(s) Oral daily  ceFAZolin   IVPB 3000 milliGRAM(s) IV Intermittent every 8 hours  docusate sodium 100 milliGRAM(s) Oral three times a day  heparin  Injectable 5000 Unit(s) SubCutaneous every 8 hours  HYDROmorphone PCA (1 mG/mL) 30 milliLiter(s) PCA Continuous PCA Continuous  insulin lispro (HumaLOG) corrective regimen sliding scale   SubCutaneous every 4 hours  lactated ringers. 1000 milliLiter(s) (125 mL/Hr) IV Continuous <Continuous>  losartan 100 milliGRAM(s) Oral daily  metoprolol tartrate 50 milliGRAM(s) Oral two times a day  sertraline 25 milliGRAM(s) Oral daily  sodium chloride 0.9% lock flush 3 milliLiter(s) IV Push every 8 hours  tamsulosin 0.4 milliGRAM(s) Oral at bedtime    MEDICATIONS  (PRN):  naloxone Injectable 0.1 milliGRAM(s) IV Push every 3 minutes PRN For ANY of the following changes in patient status:  A. RR LESS THAN 10 breaths per minute, B. Oxygen saturation LESS THAN 90%, C. Sedation score of 6  ondansetron Injectable 4 milliGRAM(s) IV Push every 6 hours PRN Nausea  ondansetron Injectable 4 milliGRAM(s) IV Push every 6 hours PRN Nausea      Vital Signs Last 24 Hrs  T(C): 36.7 (11 Jun 2018 21:30), Max: 36.7 (11 Jun 2018 21:30)  T(F): 98 (11 Jun 2018 21:30), Max: 98 (11 Jun 2018 21:30)  HR: 97 (11 Jun 2018 21:30) (85 - 97)  BP: 140/66 (11 Jun 2018 21:30) (98/56 - 140/66)  BP(mean): --  RR: 18 (11 Jun 2018 21:30) (15 - 23)  SpO2: 100% (11 Jun 2018 21:30) (96% - 100%)    Physical Exam:    Constitutional: NAD  HEENT: PERRL, EOMI  Neck: No JVD, FROM without pain  Respiratory: no accessory muscle use  Neurological: A&O x 3; without gross deficit  Abdomen: soft, obese, NT, dressings C/D/I, RUQ dressing with some serosanguinous staining

## 2018-06-13 DIAGNOSIS — R16.0 HEPATOMEGALY, NOT ELSEWHERE CLASSIFIED: ICD-10-CM

## 2018-06-13 DIAGNOSIS — L76.82 OTHER POSTPROCEDURAL COMPLICATIONS OF SKIN AND SUBCUTANEOUS TISSUE: ICD-10-CM

## 2018-06-13 LAB
ANION GAP SERPL CALC-SCNC: 9 MMOL/L — SIGNIFICANT CHANGE UP (ref 5–17)
BUN SERPL-MCNC: 23 MG/DL — HIGH (ref 8–20)
CALCIUM SERPL-MCNC: 8.1 MG/DL — LOW (ref 8.6–10.2)
CHLORIDE SERPL-SCNC: 102 MMOL/L — SIGNIFICANT CHANGE UP (ref 98–107)
CO2 SERPL-SCNC: 25 MMOL/L — SIGNIFICANT CHANGE UP (ref 22–29)
CREAT SERPL-MCNC: 0.9 MG/DL — SIGNIFICANT CHANGE UP (ref 0.5–1.3)
EOSINOPHIL # BLD AUTO: 0 K/UL — SIGNIFICANT CHANGE UP (ref 0–0.5)
EOSINOPHIL NFR BLD AUTO: 0.7 % — SIGNIFICANT CHANGE UP (ref 0–5)
GLUCOSE BLDC GLUCOMTR-MCNC: 199 MG/DL — HIGH (ref 70–99)
GLUCOSE BLDC GLUCOMTR-MCNC: 73 MG/DL — SIGNIFICANT CHANGE UP (ref 70–99)
GLUCOSE BLDC GLUCOMTR-MCNC: 74 MG/DL — SIGNIFICANT CHANGE UP (ref 70–99)
GLUCOSE BLDC GLUCOMTR-MCNC: 88 MG/DL — SIGNIFICANT CHANGE UP (ref 70–99)
GLUCOSE SERPL-MCNC: 290 MG/DL — HIGH (ref 70–115)
HCT VFR BLD CALC: 32.7 % — LOW (ref 42–52)
HGB BLD-MCNC: 10.5 G/DL — LOW (ref 14–18)
LYMPHOCYTES # BLD AUTO: 0.6 K/UL — LOW (ref 1–4.8)
LYMPHOCYTES # BLD AUTO: 13.8 % — LOW (ref 20–55)
MAGNESIUM SERPL-MCNC: 2.1 MG/DL — SIGNIFICANT CHANGE UP (ref 1.8–2.6)
MCHC RBC-ENTMCNC: 27.4 PG — SIGNIFICANT CHANGE UP (ref 27–31)
MCHC RBC-ENTMCNC: 32.1 G/DL — SIGNIFICANT CHANGE UP (ref 32–36)
MCV RBC AUTO: 85.4 FL — SIGNIFICANT CHANGE UP (ref 80–94)
MONOCYTES # BLD AUTO: 0.5 K/UL — SIGNIFICANT CHANGE UP (ref 0–0.8)
MONOCYTES NFR BLD AUTO: 11 % — HIGH (ref 3–10)
NEUTROPHILS # BLD AUTO: 3.1 K/UL — SIGNIFICANT CHANGE UP (ref 1.8–8)
NEUTROPHILS NFR BLD AUTO: 74.3 % — HIGH (ref 37–73)
PHOSPHATE SERPL-MCNC: 2 MG/DL — LOW (ref 2.4–4.7)
PLATELET # BLD AUTO: 82 K/UL — LOW (ref 150–400)
POTASSIUM SERPL-MCNC: 4.3 MMOL/L — SIGNIFICANT CHANGE UP (ref 3.5–5.3)
POTASSIUM SERPL-SCNC: 4.3 MMOL/L — SIGNIFICANT CHANGE UP (ref 3.5–5.3)
RBC # BLD: 3.83 M/UL — LOW (ref 4.6–6.2)
RBC # FLD: 16 % — HIGH (ref 11–15.6)
SODIUM SERPL-SCNC: 136 MMOL/L — SIGNIFICANT CHANGE UP (ref 135–145)
WBC # BLD: 4.2 K/UL — LOW (ref 4.8–10.8)
WBC # FLD AUTO: 4.2 K/UL — LOW (ref 4.8–10.8)

## 2018-06-13 RX ORDER — DEXTROSE 50 % IN WATER 50 %
15 SYRINGE (ML) INTRAVENOUS ONCE
Qty: 0 | Refills: 0 | Status: DISCONTINUED | OUTPATIENT
Start: 2018-06-13 | End: 2018-06-14

## 2018-06-13 RX ORDER — SODIUM CHLORIDE 9 MG/ML
1000 INJECTION, SOLUTION INTRAVENOUS
Qty: 0 | Refills: 0 | Status: DISCONTINUED | OUTPATIENT
Start: 2018-06-13 | End: 2018-06-14

## 2018-06-13 RX ORDER — GLUCAGON INJECTION, SOLUTION 0.5 MG/.1ML
1 INJECTION, SOLUTION SUBCUTANEOUS ONCE
Qty: 0 | Refills: 0 | Status: DISCONTINUED | OUTPATIENT
Start: 2018-06-13 | End: 2018-06-14

## 2018-06-13 RX ORDER — DEXTROSE 50 % IN WATER 50 %
25 SYRINGE (ML) INTRAVENOUS ONCE
Qty: 0 | Refills: 0 | Status: DISCONTINUED | OUTPATIENT
Start: 2018-06-13 | End: 2018-06-14

## 2018-06-13 RX ORDER — DEXTROSE 50 % IN WATER 50 %
12.5 SYRINGE (ML) INTRAVENOUS ONCE
Qty: 0 | Refills: 0 | Status: DISCONTINUED | OUTPATIENT
Start: 2018-06-13 | End: 2018-06-14

## 2018-06-13 RX ADMIN — Medication 100 MILLIGRAM(S): at 05:24

## 2018-06-13 RX ADMIN — LOSARTAN POTASSIUM 100 MILLIGRAM(S): 100 TABLET, FILM COATED ORAL at 05:24

## 2018-06-13 RX ADMIN — TAMSULOSIN HYDROCHLORIDE 0.4 MILLIGRAM(S): 0.4 CAPSULE ORAL at 22:09

## 2018-06-13 RX ADMIN — AMLODIPINE BESYLATE 5 MILLIGRAM(S): 2.5 TABLET ORAL at 05:24

## 2018-06-13 RX ADMIN — Medication 50 MILLIGRAM(S): at 05:24

## 2018-06-13 RX ADMIN — HEPARIN SODIUM 5000 UNIT(S): 5000 INJECTION INTRAVENOUS; SUBCUTANEOUS at 22:09

## 2018-06-13 RX ADMIN — SODIUM CHLORIDE 3 MILLILITER(S): 9 INJECTION INTRAMUSCULAR; INTRAVENOUS; SUBCUTANEOUS at 05:19

## 2018-06-13 RX ADMIN — Medication 50 MILLIGRAM(S): at 17:42

## 2018-06-13 RX ADMIN — Medication 100 MILLIGRAM(S): at 17:42

## 2018-06-13 RX ADMIN — Medication 100 MILLIGRAM(S): at 22:09

## 2018-06-13 RX ADMIN — SODIUM CHLORIDE 3 MILLILITER(S): 9 INJECTION INTRAMUSCULAR; INTRAVENOUS; SUBCUTANEOUS at 21:45

## 2018-06-13 RX ADMIN — HEPARIN SODIUM 5000 UNIT(S): 5000 INJECTION INTRAVENOUS; SUBCUTANEOUS at 12:57

## 2018-06-13 RX ADMIN — SERTRALINE 25 MILLIGRAM(S): 25 TABLET, FILM COATED ORAL at 12:56

## 2018-06-13 RX ADMIN — OXYCODONE HYDROCHLORIDE 10 MILLIGRAM(S): 5 TABLET ORAL at 02:16

## 2018-06-13 RX ADMIN — HEPARIN SODIUM 5000 UNIT(S): 5000 INJECTION INTRAVENOUS; SUBCUTANEOUS at 05:24

## 2018-06-13 RX ADMIN — OXYCODONE HYDROCHLORIDE 10 MILLIGRAM(S): 5 TABLET ORAL at 01:24

## 2018-06-13 RX ADMIN — Medication 100 MILLIGRAM(S): at 13:00

## 2018-06-13 RX ADMIN — SODIUM CHLORIDE 3 MILLILITER(S): 9 INJECTION INTRAMUSCULAR; INTRAVENOUS; SUBCUTANEOUS at 14:28

## 2018-06-13 NOTE — PROGRESS NOTE ADULT - PROBLEM SELECTOR PLAN 1
adv diet, OOB/amb, await GI function, d/c IVF, Diabetic management, plan per Dr Werner, PT consult
1. Continue Oxycodone IR as ordered  2. Pain service signed off  3. Patient to follow up with his outpatient pain management provider

## 2018-06-13 NOTE — PROGRESS NOTE ADULT - SUBJECTIVE AND OBJECTIVE BOX
INTERVAL HPI/OVERNIGHT EVENTS: follow up of diabetes    MEDICATIONS  (STANDING):  amitriptyline 100 milliGRAM(s) Oral two times a day  amLODIPine   Tablet 5 milliGRAM(s) Oral daily  docusate sodium 100 milliGRAM(s) Oral three times a day  heparin  Injectable 5000 Unit(s) SubCutaneous every 8 hours  insulin regular human (HumuLIN R U-500) Pump 1 Each SubCutaneous Continuous Pump  losartan 100 milliGRAM(s) Oral daily  metoprolol tartrate 50 milliGRAM(s) Oral two times a day  sertraline 25 milliGRAM(s) Oral daily  sodium chloride 0.9% lock flush 3 milliLiter(s) IV Push every 8 hours  tamsulosin 0.4 milliGRAM(s) Oral at bedtime    MEDICATIONS  (PRN):  HYDROmorphone  Injectable 1 milliGRAM(s) IV Push every 4 hours PRN severe BTP persisting 60 min after PO admin  naloxone Injectable 0.1 milliGRAM(s) IV Push every 3 minutes PRN For ANY of the following changes in patient status:  A. RR LESS THAN 10 breaths per minute, B. Oxygen saturation LESS THAN 90%, C. Sedation score of 6  ondansetron Injectable 4 milliGRAM(s) IV Push every 6 hours PRN Nausea  ondansetron Injectable 4 milliGRAM(s) IV Push every 6 hours PRN Nausea  oxyCODONE    IR 5 milliGRAM(s) Oral every 4 hours PRN Moderate Pain (4 - 6)  oxyCODONE    IR 10 milliGRAM(s) Oral every 4 hours PRN Severe Pain (7 - 10)      Allergies    No Known Allergies    Intolerances        Review of systems:    Vital Signs Last 24 Hrs  T(C): 36.7 (13 Jun 2018 16:29), Max: 37.1 (13 Jun 2018 08:11)  T(F): 98 (13 Jun 2018 16:29), Max: 98.7 (13 Jun 2018 08:11)  HR: 108 (13 Jun 2018 16:29) (18 - 108)  BP: 146/77 (13 Jun 2018 16:29) (125/61 - 146/77)  BP(mean): --  RR: 18 (13 Jun 2018 16:29) (18 - 19)  SpO2: 98% (13 Jun 2018 16:29) (94% - 98%)    LABS:                        10.5   4.2   )-----------( 82       ( 13 Jun 2018 07:49 )             32.7     06-13    136  |  102  |  23.0<H>  ----------------------------<  290<H>  4.3   |  25.0  |  0.90    Ca    8.1<L>      13 Jun 2018 07:49  Phos  2.0     06-13  Mg     2.1     06-13            POCT Blood Glucose.: 74 mg/dL (06-13-18 @ 12:58)  POCT Blood Glucose.: 199 mg/dL (06-13-18 @ 09:01)  POCT Blood Glucose.: 170 mg/dL (06-12-18 @ 18:10)  POCT Blood Glucose.: 109 mg/dL (06-12-18 @ 12:35)  POCT Blood Glucose.: 282 mg/dL (06-12-18 @ 05:58)  POCT Blood Glucose.: 324 mg/dL (06-12-18 @ 02:22)  POCT Blood Glucose.: 298 mg/dL (06-11-18 @ 22:22)  POCT Blood Glucose.: 186 mg/dL (06-11-18 @ 17:44)  POCT Blood Glucose.: 215 mg/dL (06-11-18 @ 10:25)      RADIOLOGY & ADDITIONAL TESTS:

## 2018-06-13 NOTE — PROGRESS NOTE ADULT - SUBJECTIVE AND OBJECTIVE BOX
SURGICAL PA NOTE:     STATUS POST:      Diagnosis:    Pre-Op Diagnosis:  Liver nodule  06/11/2018    Active  Stefany Chau     Post-Op Dx:  Liver nodule  06/11/2018    Active  Stefany Chau    Procedure:    Procedure:  Diagnostic laparoscopy  06/11/2018    Active  ROSE  Liver resection  06/11/2018    Shannan ROSE.       Operative Findings:  · Operative Findings	attempted robotic liver resection. Upon visualization with camera unable to clearly identify mass. Multiple areas of adhesions from prior surgery. converted to open liver resection. nodule visualized and confirmed with intraop US. resection performed and nodle sent frozen section. Hemostasis achieve. fascia closed with loop PDS and skin closed with staples	    Specimens/Blood Loss/IV/Output/Protocol/VTE:    Specimens/Blood Loss/IV/Output/Protocol/VTE:  · Specimens	liver nodule	  · Estimated Blood Loss	100 milliLiter(s)	      POST OPERATIVE DAY #: 2    Vital Signs Last 24 Hrs  T(C): 37.1 (13 Jun 2018 08:11), Max: 37.1 (13 Jun 2018 08:11)  T(F): 98.7 (13 Jun 2018 08:11), Max: 98.7 (13 Jun 2018 08:11)  HR: 94 (13 Jun 2018 08:11) (18 - 107)  BP: 139/77 (13 Jun 2018 08:11) (118/68 - 139/77)  BP(mean): --  RR: 18 (13 Jun 2018 08:11) (18 - 19)  SpO2: 98% (13 Jun 2018 08:11) (94% - 98%)    HPI:  This is a 67 y.o male who presents to PST today.  The pt reports he has a history of Hepatitis C and was being evaluated by Dr. Ng who specializes in this illness.  He underwent an abdominal cat scan which demonstrated a mass on the liver.  He is now scheduled for resection of same in the near future. (29 May 2018 10:13)      Disorder of liver  Family history of diabetes mellitus (Father)  Family history of brain aneurysm (Mother)  MEWS Score  Psoriasis  Cirrhosis of liver  Risk factors for obstructive sleep apnea  CVA (cerebral vascular accident)  Anxiety  Depression  Hepatitis C  Head injury  Degenerative joint disease  Hypertension  Diabetes  Liver nodule  Liver nodule  Need for prophylactic measure  Liver disease, unspecified  Diabetes mellitus, insulin dependent (IDDM), controlled  Hypertension  Liver resection  Diagnostic laparoscopy  S/P eye surgery  Previous back surgery  H/O right knee surgery  S/P knee replacement  History of total left knee replacement  S/P eye surgery  S/P hernia surgery  S/P shoulder surgery  S/P rotator cuff surgery  S/P arthroscopy of left knee  LIVER DISEASE, UNSPECIFIED      SUBJECTIVE: Pt seen lying supine with HOB up, wants to go home today, geovani po fulls yest ok, no NV, c/o mild incisional pain, states OOB to chair yest, no flatus or BM yet, fallon removed this am    Diet: fulls    Activity: OOB    Fevers: [ ]Yes [x ]NO  Chills: [ ] Yes [ x] NO  SOB:  [ ] YES [x ] NO  Dyspnea: [ ]YES [ x]NO  Chest Discomfort: [ ] YES [ x] NO    Nausea: [ ] YES [ x] NO           Vomiting: [ ] YES [ x] NO  Flatus: [ ] YES [ x] NO             Bowel Movement: [ ] YES [x ] NO  Diarrhea: [ ] YES [ x] NO         Void: [ ]YES [x ]No  Constipation: [ ] YES [ x] NO       Pain (0-10):  3            Pain Control Adequate: [ x] YES [ ] NO    Fallon: removed this am    NGT:      I&O's Detail    12 Jun 2018 07:01  -  13 Jun 2018 07:00  --------------------------------------------------------  IN:    Oral Fluid: 480 mL    Sodium Chloride 0.9% IV Bolus: 500 mL    sodium chloride 0.9%.: 675 mL  Total IN: 1655 mL    OUT:    Indwelling Catheter - Urethral: 4900 mL  Total OUT: 4900 mL    Total NET: -3245 mL        I&O's Summary    12 Jun 2018 07:01  -  13 Jun 2018 07:00  --------------------------------------------------------  IN: 1655 mL / OUT: 4900 mL / NET: -3245 mL          MEDICATIONS  (STANDING):  amitriptyline 100 milliGRAM(s) Oral two times a day  amLODIPine   Tablet 5 milliGRAM(s) Oral daily  docusate sodium 100 milliGRAM(s) Oral three times a day  heparin  Injectable 5000 Unit(s) SubCutaneous every 8 hours  insulin regular human (HumuLIN R U-500) Pump 1 Each SubCutaneous Continuous Pump  losartan 100 milliGRAM(s) Oral daily  metoprolol tartrate 50 milliGRAM(s) Oral two times a day  sertraline 25 milliGRAM(s) Oral daily  sodium chloride 0.9% lock flush 3 milliLiter(s) IV Push every 8 hours  sodium chloride 0.9%. 1000 milliLiter(s) (75 mL/Hr) IV Continuous <Continuous>  tamsulosin 0.4 milliGRAM(s) Oral at bedtime    MEDICATIONS  (PRN):  HYDROmorphone  Injectable 1 milliGRAM(s) IV Push every 4 hours PRN severe BTP persisting 60 min after PO admin  naloxone Injectable 0.1 milliGRAM(s) IV Push every 3 minutes PRN For ANY of the following changes in patient status:  A. RR LESS THAN 10 breaths per minute, B. Oxygen saturation LESS THAN 90%, C. Sedation score of 6  ondansetron Injectable 4 milliGRAM(s) IV Push every 6 hours PRN Nausea  ondansetron Injectable 4 milliGRAM(s) IV Push every 6 hours PRN Nausea  oxyCODONE    IR 5 milliGRAM(s) Oral every 4 hours PRN Moderate Pain (4 - 6)  oxyCODONE    IR 10 milliGRAM(s) Oral every 4 hours PRN Severe Pain (7 - 10)      LABS:                        10.5   4.2   )-----------( 82       ( 13 Jun 2018 07:49 )             32.7     06-13    136  |  102  |  23.0<H>  ----------------------------<  290<H>  4.3   |  25.0  |  0.90    Ca    8.1<L>      13 Jun 2018 07:49  Phos  2.0     06-13  Mg     2.1     06-13              RADIOLOGY & ADDITIONAL STUDIES: SURGICAL PA NOTE: Pt seen and examined with Dr Werner    STATUS POST:      Diagnosis:    Pre-Op Diagnosis:  Liver nodule  06/11/2018    Active  Stefany Chau     Post-Op Dx:  Liver nodule  06/11/2018    Active  Stefany Chau    Procedure:    Procedure:  Diagnostic laparoscopy  06/11/2018    Active  ROSE  Liver resection  06/11/2018    Active  ROSE.       Operative Findings:  · Operative Findings	attempted robotic liver resection. Upon visualization with camera unable to clearly identify mass. Multiple areas of adhesions from prior surgery. converted to open liver resection. nodule visualized and confirmed with intraop US. resection performed and nodle sent frozen section. Hemostasis achieve. fascia closed with loop PDS and skin closed with staples	    Specimens/Blood Loss/IV/Output/Protocol/VTE:    Specimens/Blood Loss/IV/Output/Protocol/VTE:  · Specimens	liver nodule	  · Estimated Blood Loss	100 milliLiter(s)	      POST OPERATIVE DAY #: 2    Vital Signs Last 24 Hrs  T(C): 37.1 (13 Jun 2018 08:11), Max: 37.1 (13 Jun 2018 08:11)  T(F): 98.7 (13 Jun 2018 08:11), Max: 98.7 (13 Jun 2018 08:11)  HR: 94 (13 Jun 2018 08:11) (18 - 107)  BP: 139/77 (13 Jun 2018 08:11) (118/68 - 139/77)  BP(mean): --  RR: 18 (13 Jun 2018 08:11) (18 - 19)  SpO2: 98% (13 Jun 2018 08:11) (94% - 98%)    HPI:  This is a 67 y.o male who presents to PST today.  The pt reports he has a history of Hepatitis C and was being evaluated by Dr. Ng who specializes in this illness.  He underwent an abdominal cat scan which demonstrated a mass on the liver.  He is now scheduled for resection of same in the near future. (29 May 2018 10:13)      Disorder of liver  Family history of diabetes mellitus (Father)  Family history of brain aneurysm (Mother)  MEWS Score  Psoriasis  Cirrhosis of liver  Risk factors for obstructive sleep apnea  CVA (cerebral vascular accident)  Anxiety  Depression  Hepatitis C  Head injury  Degenerative joint disease  Hypertension  Diabetes  Liver nodule  Liver nodule  Need for prophylactic measure  Liver disease, unspecified  Diabetes mellitus, insulin dependent (IDDM), controlled  Hypertension  Liver resection  Diagnostic laparoscopy  S/P eye surgery  Previous back surgery  H/O right knee surgery  S/P knee replacement  History of total left knee replacement  S/P eye surgery  S/P hernia surgery  S/P shoulder surgery  S/P rotator cuff surgery  S/P arthroscopy of left knee  LIVER DISEASE, UNSPECIFIED      SUBJECTIVE: Pt seen lying supine with HOB up, wants to go home today, geovani po fulls yest ok, no NV, c/o mild incisional pain, states OOB to chair yest, no flatus or BM yet, fallon removed this am    Diet: fulls    Activity: OOB    Fevers: [ ]Yes [x ]NO  Chills: [ ] Yes [ x] NO  SOB:  [ ] YES [x ] NO  Dyspnea: [ ]YES [ x]NO  Chest Discomfort: [ ] YES [ x] NO    Nausea: [ ] YES [ x] NO           Vomiting: [ ] YES [ x] NO  Flatus: [ ] YES [ x] NO             Bowel Movement: [ ] YES [x ] NO  Diarrhea: [ ] YES [ x] NO         Void: [ ]YES [x ]No  Constipation: [ ] YES [ x] NO       Pain (0-10):  3            Pain Control Adequate: [ x] YES [ ] NO    Fallon: removed this am    NGT:      I&O's Detail    12 Jun 2018 07:01  -  13 Jun 2018 07:00  --------------------------------------------------------  IN:    Oral Fluid: 480 mL    Sodium Chloride 0.9% IV Bolus: 500 mL    sodium chloride 0.9%.: 675 mL  Total IN: 1655 mL    OUT:    Indwelling Catheter - Urethral: 4900 mL  Total OUT: 4900 mL    Total NET: -3245 mL        I&O's Summary    12 Jun 2018 07:01  -  13 Jun 2018 07:00  --------------------------------------------------------  IN: 1655 mL / OUT: 4900 mL / NET: -3245 mL          MEDICATIONS  (STANDING):  amitriptyline 100 milliGRAM(s) Oral two times a day  amLODIPine   Tablet 5 milliGRAM(s) Oral daily  docusate sodium 100 milliGRAM(s) Oral three times a day  heparin  Injectable 5000 Unit(s) SubCutaneous every 8 hours  insulin regular human (HumuLIN R U-500) Pump 1 Each SubCutaneous Continuous Pump  losartan 100 milliGRAM(s) Oral daily  metoprolol tartrate 50 milliGRAM(s) Oral two times a day  sertraline 25 milliGRAM(s) Oral daily  sodium chloride 0.9% lock flush 3 milliLiter(s) IV Push every 8 hours  sodium chloride 0.9%. 1000 milliLiter(s) (75 mL/Hr) IV Continuous <Continuous>  tamsulosin 0.4 milliGRAM(s) Oral at bedtime    MEDICATIONS  (PRN):  HYDROmorphone  Injectable 1 milliGRAM(s) IV Push every 4 hours PRN severe BTP persisting 60 min after PO admin  naloxone Injectable 0.1 milliGRAM(s) IV Push every 3 minutes PRN For ANY of the following changes in patient status:  A. RR LESS THAN 10 breaths per minute, B. Oxygen saturation LESS THAN 90%, C. Sedation score of 6  ondansetron Injectable 4 milliGRAM(s) IV Push every 6 hours PRN Nausea  ondansetron Injectable 4 milliGRAM(s) IV Push every 6 hours PRN Nausea  oxyCODONE    IR 5 milliGRAM(s) Oral every 4 hours PRN Moderate Pain (4 - 6)  oxyCODONE    IR 10 milliGRAM(s) Oral every 4 hours PRN Severe Pain (7 - 10)      LABS:                        10.5   4.2   )-----------( 82       ( 13 Jun 2018 07:49 )             32.7     06-13    136  |  102  |  23.0<H>  ----------------------------<  290<H>  4.3   |  25.0  |  0.90    Ca    8.1<L>      13 Jun 2018 07:49  Phos  2.0     06-13  Mg     2.1     06-13              RADIOLOGY & ADDITIONAL STUDIES:

## 2018-06-13 NOTE — PROGRESS NOTE ADULT - ASSESSMENT
Pt. self managing his diabetes with u500 in insulin pump. Discussed reduction in basal insulin use to avoid hypoglycemia. Offered outpatient follow up.

## 2018-06-13 NOTE — PROGRESS NOTE ADULT - SUBJECTIVE AND OBJECTIVE BOX
Patient is a 67y old  Male who presents with a chief complaint of back pain (29 May 2018 10:37)  He is now POD#2 attempted robotic liver resection. He is s/p IVPCA on 06/12/2018, now on oral analgesics. Pending discharge to home.      VERBAL REPORT:  PAIN SCORE 3-4/10  "The pills are working I have been taking them for years. I just preferred the convenience of the pump, which also worked well."        MEDICATIONS  (STANDING):  amitriptyline 100 milliGRAM(s) Oral two times a day  amLODIPine   Tablet 5 milliGRAM(s) Oral daily  docusate sodium 100 milliGRAM(s) Oral three times a day  heparin  Injectable 5000 Unit(s) SubCutaneous every 8 hours  insulin regular human (HumuLIN R U-500) Pump 1 Each SubCutaneous Continuous Pump  losartan 100 milliGRAM(s) Oral daily  metoprolol tartrate 50 milliGRAM(s) Oral two times a day  sertraline 25 milliGRAM(s) Oral daily  sodium chloride 0.9% lock flush 3 milliLiter(s) IV Push every 8 hours  tamsulosin 0.4 milliGRAM(s) Oral at bedtime    MEDICATIONS  (PRN):  HYDROmorphone  Injectable 1 milliGRAM(s) IV Push every 4 hours PRN severe BTP persisting 60 min after PO admin  naloxone Injectable 0.1 milliGRAM(s) IV Push every 3 minutes PRN For ANY of the following changes in patient status:  A. RR LESS THAN 10 breaths per minute, B. Oxygen saturation LESS THAN 90%, C. Sedation score of 6  ondansetron Injectable 4 milliGRAM(s) IV Push every 6 hours PRN Nausea  ondansetron Injectable 4 milliGRAM(s) IV Push every 6 hours PRN Nausea  oxyCODONE    IR 5 milliGRAM(s) Oral every 4 hours PRN Moderate Pain (4 - 6)  oxyCODONE    IR 10 milliGRAM(s) Oral every 4 hours PRN Severe Pain (7 - 10)      Vital Signs Last 24 Hrs  T(C): 37.1 (13 Jun 2018 08:11), Max: 37.1 (13 Jun 2018 08:11)  T(F): 98.7 (13 Jun 2018 08:11), Max: 98.7 (13 Jun 2018 08:11)  HR: 94 (13 Jun 2018 08:11) (18 - 107)  BP: 139/77 (13 Jun 2018 08:11) (118/68 - 139/77)  BP(mean): --  RR: 18 (13 Jun 2018 08:11) (18 - 19)  SpO2: 98% (13 Jun 2018 08:11) (94% - 98%)                          10.5   4.2   )-----------( 82       ( 13 Jun 2018 07:49 )             32.7

## 2018-06-14 ENCOUNTER — TRANSCRIPTION ENCOUNTER (OUTPATIENT)
Age: 68
End: 2018-06-14

## 2018-06-14 VITALS
HEART RATE: 89 BPM | RESPIRATION RATE: 20 BRPM | OXYGEN SATURATION: 100 % | SYSTOLIC BLOOD PRESSURE: 129 MMHG | DIASTOLIC BLOOD PRESSURE: 72 MMHG | TEMPERATURE: 98 F

## 2018-06-14 LAB
GLUCOSE BLDC GLUCOMTR-MCNC: 104 MG/DL — HIGH (ref 70–99)
GLUCOSE BLDC GLUCOMTR-MCNC: 175 MG/DL — HIGH (ref 70–99)
GLUCOSE BLDC GLUCOMTR-MCNC: 253 MG/DL — HIGH (ref 70–99)

## 2018-06-14 PROCEDURE — C1889: CPT

## 2018-06-14 PROCEDURE — 84100 ASSAY OF PHOSPHORUS: CPT

## 2018-06-14 PROCEDURE — 82962 GLUCOSE BLOOD TEST: CPT

## 2018-06-14 PROCEDURE — 83735 ASSAY OF MAGNESIUM: CPT

## 2018-06-14 PROCEDURE — 36415 COLL VENOUS BLD VENIPUNCTURE: CPT

## 2018-06-14 PROCEDURE — 88307 TISSUE EXAM BY PATHOLOGIST: CPT

## 2018-06-14 PROCEDURE — 88331 PATH CONSLTJ SURG 1 BLK 1SPC: CPT

## 2018-06-14 PROCEDURE — 82140 ASSAY OF AMMONIA: CPT

## 2018-06-14 PROCEDURE — 97163 PT EVAL HIGH COMPLEX 45 MIN: CPT

## 2018-06-14 PROCEDURE — 85027 COMPLETE CBC AUTOMATED: CPT

## 2018-06-14 PROCEDURE — 80048 BASIC METABOLIC PNL TOTAL CA: CPT

## 2018-06-14 RX ADMIN — SERTRALINE 25 MILLIGRAM(S): 25 TABLET, FILM COATED ORAL at 12:32

## 2018-06-14 RX ADMIN — AMLODIPINE BESYLATE 5 MILLIGRAM(S): 2.5 TABLET ORAL at 05:27

## 2018-06-14 RX ADMIN — Medication 50 MILLIGRAM(S): at 05:27

## 2018-06-14 RX ADMIN — SODIUM CHLORIDE 3 MILLILITER(S): 9 INJECTION INTRAMUSCULAR; INTRAVENOUS; SUBCUTANEOUS at 05:24

## 2018-06-14 RX ADMIN — HEPARIN SODIUM 5000 UNIT(S): 5000 INJECTION INTRAVENOUS; SUBCUTANEOUS at 14:08

## 2018-06-14 RX ADMIN — SODIUM CHLORIDE 3 MILLILITER(S): 9 INJECTION INTRAMUSCULAR; INTRAVENOUS; SUBCUTANEOUS at 14:07

## 2018-06-14 RX ADMIN — Medication 100 MILLIGRAM(S): at 05:27

## 2018-06-14 RX ADMIN — HEPARIN SODIUM 5000 UNIT(S): 5000 INJECTION INTRAVENOUS; SUBCUTANEOUS at 05:27

## 2018-06-14 RX ADMIN — LOSARTAN POTASSIUM 100 MILLIGRAM(S): 100 TABLET, FILM COATED ORAL at 05:27

## 2018-06-14 NOTE — DISCHARGE NOTE ADULT - CARE PLAN
Principal Discharge DX:	Liver mass  Goal:	Alleviation of pain and symptoms  Assessment and plan of treatment:	Follow up: Please call and make an appointment with Dr. Werner 1 week after discharge. Also, please call and make an appointment with your primary care physician as per your usual schedule.   Activity: Please, limit activity and rest until follow up appointment.   Diet: May continue a diabetic diet.  Medications: Please take all home medications as prescribed by your Primary doctor.  You are encouraged to take tylenol and ibuprofen for pain relief.  Wound Care: Please, keep wound site clean and dry. You may shower, but do not bathe    If confusion, altered mental status, fever, chest pain, shortness of breath, new or worsening abdominal pain, vomiting, change or worsening of medical status, please come back to the emergency room, and in case of emergency call 911.

## 2018-06-14 NOTE — PHYSICAL THERAPY INITIAL EVALUATION ADULT - ADDITIONAL COMMENTS
Pt lives in a 1 story house with 2 steps to enter, with significant other.  Independent with all, PTA, without devices.
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
## 2018-06-14 NOTE — DISCHARGE NOTE ADULT - NS AS ACTIVITY OBS
Walking-Outdoors allowed/Walking-Indoors allowed/Stairs allowed/No Heavy lifting/straining/Showering allowed

## 2018-06-14 NOTE — DISCHARGE NOTE ADULT - MEDICATION SUMMARY - MEDICATIONS TO TAKE
I will START or STAY ON the medications listed below when I get home from the hospital:    oxyCODONE 10 mg oral tablet  -- 1 tab(s) by mouth every 4 to 6 hours, As Needed -Moderate Pain  -- Indication: For Per PMD    aspirin 81 mg oral tablet  -- 1 tab(s) by mouth once a day  -- Indication: For Per PMD    losartan 100 mg oral tablet  -- 1 tab(s) by mouth once a day  -- Indication: For Per PMD    tamsulosin 0.4 mg oral capsule  -- 1 cap(s) by mouth once a day  -- Indication: For Per PMD    amitriptyline 100 mg oral tablet  -- 1 tab(s) by mouth 2 times a day  -- Indication: For Per PMD    sertraline 25 mg oral tablet  -- 1 tab(s) by mouth once a day  -- Indication: For Per PMD    Victoza 18 mg/3 mL subcutaneous solution  -- subcutaneous once a day  -- Indication: For Per PMD    HumuLIN R (Concentrated) 500 units/mL human recombinant subcutaneous solution  -- 2.5 units per hour via insulin pump  -- Indication: For Per PMD    metoprolol tartrate 50 mg oral tablet  -- 1 tab(s) by mouth 2 times a day  Hold for SBP<110 or HR <60  -- Indication: For Per PMD    amLODIPine 5 mg oral tablet  -- 1 tab(s) by mouth once a day  -- Indication: For Per PMD    lactulose 10 g/15 mL oral syrup  -- 15 milliliter(s) by mouth once a day  -- Indication: For Per PMD

## 2018-06-14 NOTE — DISCHARGE NOTE ADULT - PATIENT PORTAL LINK FT
You can access the [facility] Patient Portal, offered by [facility], by registering with the following website: [email or web address]

## 2018-06-14 NOTE — DISCHARGE NOTE ADULT - CARE PROVIDER_API CALL
Buzz Werner), ColonRectal Surgery; Surgery  [address] [place] [address]  Phone: [phone]  Fax: [phone]

## 2018-06-14 NOTE — DISCHARGE NOTE ADULT - NS AS DC STROKE ED MATERIALS
Risk Factors for Stroke/Stroke Education Booklet/Stroke Warning Signs and Symptoms/Call 911 for Stroke/Need for Followup After Discharge/Prescribed Medications

## 2018-06-14 NOTE — DISCHARGE NOTE ADULT - PLAN OF CARE
Alleviation of pain and symptoms Follow up: Please call and make an appointment with Dr. Werner 1 week after discharge. Also, please call and make an appointment with your primary care physician as per your usual schedule.   Activity: Please, limit activity and rest until follow up appointment.   Diet: May continue a diabetic diet.  Medications: Please take all home medications as prescribed by your Primary doctor.  You are encouraged to take tylenol and ibuprofen for pain relief.  Wound Care: Please, keep wound site clean and dry. You may shower, but do not bathe    If confusion, altered mental status, fever, chest pain, shortness of breath, new or worsening abdominal pain, vomiting, change or worsening of medical status, please come back to the emergency room, and in case of emergency call 911.

## 2018-06-19 LAB — SURGICAL PATHOLOGY FINAL REPORT - CH: SIGNIFICANT CHANGE UP

## 2018-06-27 ENCOUNTER — APPOINTMENT (OUTPATIENT)
Dept: SURGICAL ONCOLOGY | Facility: CLINIC | Age: 68
End: 2018-06-27
Payer: COMMERCIAL

## 2018-06-27 VITALS
WEIGHT: 311 LBS | OXYGEN SATURATION: 98 % | DIASTOLIC BLOOD PRESSURE: 81 MMHG | BODY MASS INDEX: 44.52 KG/M2 | SYSTOLIC BLOOD PRESSURE: 145 MMHG | HEIGHT: 70 IN | RESPIRATION RATE: 16 BRPM | HEART RATE: 97 BPM

## 2018-06-27 PROCEDURE — 99024 POSTOP FOLLOW-UP VISIT: CPT

## 2018-07-11 ENCOUNTER — APPOINTMENT (OUTPATIENT)
Dept: SURGICAL ONCOLOGY | Facility: CLINIC | Age: 68
End: 2018-07-11
Payer: COMMERCIAL

## 2018-07-11 VITALS
DIASTOLIC BLOOD PRESSURE: 77 MMHG | SYSTOLIC BLOOD PRESSURE: 139 MMHG | WEIGHT: 315 LBS | HEIGHT: 70 IN | BODY MASS INDEX: 45.1 KG/M2 | TEMPERATURE: 98.7 F | OXYGEN SATURATION: 95 % | HEART RATE: 87 BPM

## 2018-07-11 PROCEDURE — 99024 POSTOP FOLLOW-UP VISIT: CPT

## 2018-07-19 ENCOUNTER — OUTPATIENT (OUTPATIENT)
Dept: OUTPATIENT SERVICES | Facility: HOSPITAL | Age: 68
LOS: 1 days | Discharge: HOME | End: 2018-07-19

## 2018-07-19 DIAGNOSIS — Z98.890 OTHER SPECIFIED POSTPROCEDURAL STATES: Chronic | ICD-10-CM

## 2018-07-19 DIAGNOSIS — D64.9 ANEMIA, UNSPECIFIED: ICD-10-CM

## 2018-07-20 PROBLEM — K74.60 UNSPECIFIED CIRRHOSIS OF LIVER: Chronic | Status: ACTIVE | Noted: 2018-04-30

## 2018-07-22 PROBLEM — Z86.73 HISTORY OF STROKE: Status: RESOLVED | Noted: 2018-04-11 | Resolved: 2018-07-22

## 2018-07-26 ENCOUNTER — RESULT REVIEW (OUTPATIENT)
Age: 68
End: 2018-07-26

## 2018-07-26 ENCOUNTER — LABORATORY RESULT (OUTPATIENT)
Age: 68
End: 2018-07-26

## 2018-07-26 ENCOUNTER — APPOINTMENT (OUTPATIENT)
Dept: HEMATOLOGY ONCOLOGY | Facility: CLINIC | Age: 68
End: 2018-07-26
Payer: COMMERCIAL

## 2018-07-26 VITALS
HEIGHT: 70 IN | DIASTOLIC BLOOD PRESSURE: 77 MMHG | BODY MASS INDEX: 44.69 KG/M2 | OXYGEN SATURATION: 98 % | WEIGHT: 312.17 LBS | HEART RATE: 93 BPM | TEMPERATURE: 98.1 F | SYSTOLIC BLOOD PRESSURE: 134 MMHG

## 2018-07-26 DIAGNOSIS — Z82.49 FAMILY HISTORY OF ISCHEMIC HEART DISEASE AND OTHER DISEASES OF THE CIRCULATORY SYSTEM: ICD-10-CM

## 2018-07-26 DIAGNOSIS — Z87.891 PERSONAL HISTORY OF NICOTINE DEPENDENCE: ICD-10-CM

## 2018-07-26 LAB
BASOPHILS # BLD AUTO: 0 K/UL — SIGNIFICANT CHANGE UP (ref 0–0.2)
BASOPHILS NFR BLD AUTO: 0.8 % — SIGNIFICANT CHANGE UP (ref 0–2)
EOSINOPHIL # BLD AUTO: 0.1 K/UL — SIGNIFICANT CHANGE UP (ref 0–0.5)
EOSINOPHIL NFR BLD AUTO: 3.7 % — SIGNIFICANT CHANGE UP (ref 0–6)
HCT VFR BLD CALC: 36.9 % — LOW (ref 39–50)
HGB BLD-MCNC: 12.2 G/DL — LOW (ref 13–17)
LYMPHOCYTES # BLD AUTO: 0.6 K/UL — LOW (ref 1–3.3)
LYMPHOCYTES # BLD AUTO: 15.9 % — SIGNIFICANT CHANGE UP (ref 13–44)
MCHC RBC-ENTMCNC: 27.5 PG — SIGNIFICANT CHANGE UP (ref 27–34)
MCHC RBC-ENTMCNC: 33.2 GM/DL — SIGNIFICANT CHANGE UP (ref 32–36)
MCV RBC AUTO: 82.8 FL — SIGNIFICANT CHANGE UP (ref 80–100)
MONOCYTES # BLD AUTO: 0.4 K/UL — SIGNIFICANT CHANGE UP (ref 0–0.9)
MONOCYTES NFR BLD AUTO: 9.4 % — SIGNIFICANT CHANGE UP (ref 2–14)
NEUTROPHILS # BLD AUTO: 2.8 K/UL — SIGNIFICANT CHANGE UP (ref 1.8–7.4)
NEUTROPHILS NFR BLD AUTO: 70.2 % — SIGNIFICANT CHANGE UP (ref 43–77)
PLATELET # BLD AUTO: 107 K/UL — LOW (ref 150–400)
RBC # BLD: 4.46 M/UL — SIGNIFICANT CHANGE UP (ref 4.2–5.8)
RBC # FLD: 13.8 % — SIGNIFICANT CHANGE UP (ref 10.3–14.5)
WBC # BLD: 4 K/UL — SIGNIFICANT CHANGE UP (ref 3.8–10.5)
WBC # FLD AUTO: 4 K/UL — SIGNIFICANT CHANGE UP (ref 3.8–10.5)

## 2018-07-26 PROCEDURE — 99205 OFFICE O/P NEW HI 60 MIN: CPT

## 2018-07-26 RX ORDER — LACTULOSE SOLUTION USP, 10 G/15 ML 10 G/15ML
10 SOLUTION ORAL; RECTAL
Qty: 946 | Refills: 0 | Status: ACTIVE | COMMUNITY
Start: 2018-04-02

## 2018-07-26 RX ORDER — OXYCODONE 10 MG/1
10 TABLET ORAL
Qty: 110 | Refills: 0 | Status: ACTIVE | COMMUNITY
Start: 2018-04-30

## 2018-07-26 RX ORDER — LIRAGLUTIDE 6 MG/ML
18 INJECTION SUBCUTANEOUS
Qty: 9 | Refills: 0 | Status: ACTIVE | COMMUNITY
Start: 2018-05-19

## 2018-07-26 RX ORDER — TAMSULOSIN HYDROCHLORIDE 0.4 MG/1
0.4 CAPSULE ORAL
Qty: 90 | Refills: 0 | Status: ACTIVE | COMMUNITY
Start: 2018-04-09

## 2018-07-30 LAB
AFP-TM SERPL-MCNC: 13.3 NG/ML
ALBUMIN SERPL ELPH-MCNC: 3.8 G/DL
ALP BLD-CCNC: 129 U/L
ALT SERPL-CCNC: 67 U/L
ANION GAP SERPL CALC-SCNC: 17 MMOL/L
AST SERPL-CCNC: 64 U/L
BILIRUB SERPL-MCNC: 0.5 MG/DL
BUN SERPL-MCNC: 25 MG/DL
CALCIUM SERPL-MCNC: 9.6 MG/DL
CHLORIDE SERPL-SCNC: 101 MMOL/L
CO2 SERPL-SCNC: 25 MMOL/L
CREAT SERPL-MCNC: 0.98 MG/DL
GLUCOSE SERPL-MCNC: 123 MG/DL
HBV CORE IGG+IGM SER QL: NONREACTIVE
HBV SURFACE AB SER QL: NONREACTIVE
HBV SURFACE AG SER QL: NONREACTIVE
HCV AB SER QL: REACTIVE
HCV S/CO RATIO: 15.07 S/CO
INR PPP: 0.97 RATIO
MAGNESIUM SERPL-MCNC: 1.8 MG/DL
POTASSIUM SERPL-SCNC: 4.6 MMOL/L
PROT SERPL-MCNC: 6.8 G/DL
PT BLD: 10.9 SEC
SODIUM SERPL-SCNC: 143 MMOL/L

## 2018-08-02 LAB — PATH REPORT ADDENDUM.SYNOPTIC DOC: SIGNIFICANT CHANGE UP

## 2018-08-03 ENCOUNTER — RECORD ABSTRACTING (OUTPATIENT)
Age: 68
End: 2018-08-03

## 2018-08-09 ENCOUNTER — APPOINTMENT (OUTPATIENT)
Dept: HEMATOLOGY ONCOLOGY | Facility: CLINIC | Age: 68
End: 2018-08-09
Payer: COMMERCIAL

## 2018-08-10 ENCOUNTER — LABORATORY RESULT (OUTPATIENT)
Age: 68
End: 2018-08-10

## 2018-08-10 ENCOUNTER — APPOINTMENT (OUTPATIENT)
Dept: HEMATOLOGY ONCOLOGY | Facility: CLINIC | Age: 68
End: 2018-08-10
Payer: COMMERCIAL

## 2018-08-10 ENCOUNTER — RESULT REVIEW (OUTPATIENT)
Age: 68
End: 2018-08-10

## 2018-08-10 VITALS
HEIGHT: 70 IN | OXYGEN SATURATION: 94 % | TEMPERATURE: 97.9 F | SYSTOLIC BLOOD PRESSURE: 119 MMHG | HEART RATE: 112 BPM | WEIGHT: 315 LBS | BODY MASS INDEX: 45.1 KG/M2 | DIASTOLIC BLOOD PRESSURE: 74 MMHG

## 2018-08-10 LAB
BASOPHILS # BLD AUTO: 0 K/UL — SIGNIFICANT CHANGE UP (ref 0–0.2)
BASOPHILS NFR BLD AUTO: 0.6 % — SIGNIFICANT CHANGE UP (ref 0–2)
EOSINOPHIL # BLD AUTO: 0.1 K/UL — SIGNIFICANT CHANGE UP (ref 0–0.5)
EOSINOPHIL NFR BLD AUTO: 2.9 % — SIGNIFICANT CHANGE UP (ref 0–6)
HCT VFR BLD CALC: 33.2 % — LOW (ref 39–50)
HGB BLD-MCNC: 11.1 G/DL — LOW (ref 13–17)
LYMPHOCYTES # BLD AUTO: 0.6 K/UL — LOW (ref 1–3.3)
LYMPHOCYTES # BLD AUTO: 17 % — SIGNIFICANT CHANGE UP (ref 13–44)
MCHC RBC-ENTMCNC: 27.3 PG — SIGNIFICANT CHANGE UP (ref 27–34)
MCHC RBC-ENTMCNC: 33.5 GM/DL — SIGNIFICANT CHANGE UP (ref 32–36)
MCV RBC AUTO: 81.5 FL — SIGNIFICANT CHANGE UP (ref 80–100)
MONOCYTES # BLD AUTO: 0.3 K/UL — SIGNIFICANT CHANGE UP (ref 0–0.9)
MONOCYTES NFR BLD AUTO: 7.6 % — SIGNIFICANT CHANGE UP (ref 2–14)
NEUTROPHILS # BLD AUTO: 2.4 K/UL — SIGNIFICANT CHANGE UP (ref 1.8–7.4)
NEUTROPHILS NFR BLD AUTO: 71.9 % — SIGNIFICANT CHANGE UP (ref 43–77)
PLATELET # BLD AUTO: 88 K/UL — LOW (ref 150–400)
RBC # BLD: 4.08 M/UL — LOW (ref 4.2–5.8)
RBC # FLD: 14.2 % — SIGNIFICANT CHANGE UP (ref 10.3–14.5)
WBC # BLD: 3.3 K/UL — LOW (ref 3.8–10.5)
WBC # FLD AUTO: 3.3 K/UL — LOW (ref 3.8–10.5)

## 2018-08-10 PROCEDURE — 99215 OFFICE O/P EST HI 40 MIN: CPT

## 2018-08-15 LAB
ALBUMIN SERPL ELPH-MCNC: 3.6 G/DL
ALP BLD-CCNC: 142 U/L
ALT SERPL-CCNC: 65 U/L
ANION GAP SERPL CALC-SCNC: 12 MMOL/L
AST SERPL-CCNC: 74 U/L
BILIRUB SERPL-MCNC: 0.4 MG/DL
BUN SERPL-MCNC: 22 MG/DL
CALCIUM SERPL-MCNC: 9.5 MG/DL
CHLORIDE SERPL-SCNC: 102 MMOL/L
CO2 SERPL-SCNC: 26 MMOL/L
CREAT SERPL-MCNC: 0.91 MG/DL
GLUCOSE SERPL-MCNC: 237 MG/DL
MAGNESIUM SERPL-MCNC: 1.8 MG/DL
POTASSIUM SERPL-SCNC: 5.1 MMOL/L
PROT SERPL-MCNC: 6.5 G/DL
SODIUM SERPL-SCNC: 140 MMOL/L

## 2018-08-22 ENCOUNTER — APPOINTMENT (OUTPATIENT)
Dept: SURGICAL ONCOLOGY | Facility: CLINIC | Age: 68
End: 2018-08-22
Payer: COMMERCIAL

## 2018-08-22 VITALS
TEMPERATURE: 97.8 F | HEIGHT: 70 IN | WEIGHT: 315 LBS | DIASTOLIC BLOOD PRESSURE: 81 MMHG | HEART RATE: 87 BPM | OXYGEN SATURATION: 92 % | SYSTOLIC BLOOD PRESSURE: 130 MMHG | BODY MASS INDEX: 45.1 KG/M2

## 2018-08-22 PROCEDURE — 99024 POSTOP FOLLOW-UP VISIT: CPT

## 2018-11-08 ENCOUNTER — OUTPATIENT (OUTPATIENT)
Dept: OUTPATIENT SERVICES | Facility: HOSPITAL | Age: 68
LOS: 1 days | Discharge: HOME | End: 2018-11-08

## 2018-11-08 DIAGNOSIS — Z98.890 OTHER SPECIFIED POSTPROCEDURAL STATES: Chronic | ICD-10-CM

## 2018-11-08 DIAGNOSIS — D64.9 ANEMIA, UNSPECIFIED: ICD-10-CM

## 2018-11-12 ENCOUNTER — APPOINTMENT (OUTPATIENT)
Dept: HEMATOLOGY ONCOLOGY | Facility: CLINIC | Age: 68
End: 2018-11-12

## 2018-11-28 ENCOUNTER — APPOINTMENT (OUTPATIENT)
Dept: SURGICAL ONCOLOGY | Facility: CLINIC | Age: 68
End: 2018-11-28
Payer: COMMERCIAL

## 2018-11-28 VITALS
SYSTOLIC BLOOD PRESSURE: 123 MMHG | HEIGHT: 70 IN | BODY MASS INDEX: 44.24 KG/M2 | HEART RATE: 109 BPM | WEIGHT: 309 LBS | TEMPERATURE: 97.9 F | DIASTOLIC BLOOD PRESSURE: 72 MMHG | OXYGEN SATURATION: 99 %

## 2018-11-28 PROCEDURE — 99215 OFFICE O/P EST HI 40 MIN: CPT

## 2019-01-14 ENCOUNTER — OUTPATIENT (OUTPATIENT)
Dept: OUTPATIENT SERVICES | Facility: HOSPITAL | Age: 69
LOS: 1 days | Discharge: HOME | End: 2019-01-14

## 2019-01-14 DIAGNOSIS — D64.9 ANEMIA, UNSPECIFIED: ICD-10-CM

## 2019-01-14 DIAGNOSIS — Z98.890 OTHER SPECIFIED POSTPROCEDURAL STATES: Chronic | ICD-10-CM

## 2019-01-16 ENCOUNTER — APPOINTMENT (OUTPATIENT)
Dept: HEMATOLOGY ONCOLOGY | Facility: CLINIC | Age: 69
End: 2019-01-16

## 2019-02-08 ENCOUNTER — OUTPATIENT (OUTPATIENT)
Dept: OUTPATIENT SERVICES | Facility: HOSPITAL | Age: 69
LOS: 1 days | Discharge: HOME | End: 2019-02-08

## 2019-02-08 DIAGNOSIS — D64.9 ANEMIA, UNSPECIFIED: ICD-10-CM

## 2019-02-08 DIAGNOSIS — Z98.890 OTHER SPECIFIED POSTPROCEDURAL STATES: Chronic | ICD-10-CM

## 2019-02-14 ENCOUNTER — APPOINTMENT (OUTPATIENT)
Dept: HEMATOLOGY ONCOLOGY | Facility: CLINIC | Age: 69
End: 2019-02-14

## 2019-02-15 ENCOUNTER — APPOINTMENT (OUTPATIENT)
Dept: HEMATOLOGY ONCOLOGY | Facility: CLINIC | Age: 69
End: 2019-02-15
Payer: COMMERCIAL

## 2019-02-15 VITALS
WEIGHT: 315 LBS | HEART RATE: 104 BPM | BODY MASS INDEX: 45.1 KG/M2 | DIASTOLIC BLOOD PRESSURE: 71 MMHG | HEIGHT: 70 IN | OXYGEN SATURATION: 95 % | SYSTOLIC BLOOD PRESSURE: 117 MMHG

## 2019-02-15 DIAGNOSIS — B18.2 CHRONIC VIRAL HEPATITIS C: ICD-10-CM

## 2019-02-15 PROCEDURE — 99214 OFFICE O/P EST MOD 30 MIN: CPT

## 2019-02-15 NOTE — HISTORY OF PRESENT ILLNESS
[de-identified] : The patient was diagnosed with hepatocellular carcinoma in June 2018 at the age of 67.  Patient follows with GI, Dr. Jluis Ng, due to his h/o Hep C.  He had a routine abdominal U/S that showed diffuse increased echogenicity to liver parenchyma most compatible with fatty infiltration versus diffuse hepatocellular disease.  This was followed by an abdominal MRI that stated in the dome of the liver, there is a 2 x 1 cm arterially hyper enhancing nodule which remains hyper enhanced on delayed post contrast imaging, without wash out.  He was referred to Dr. Werner.  A repeat MRI abdomen showed cirrhosis and hepatic steatosis. Indeterminant nodule in the hepatic  dome which is hypointense on hepatobiliary phase,  measuring 1.8 cm and demonstrates arterial enhancement with an intermediate probability for HCC (LIRADS category III).  On 6/11/18, Dr. Werner performed an open resection of the hepatic mass. Final pathology was consistent with hepatocellular carcinoma in a background of cirrhosis.  [de-identified] : Patient presents for follow up regarding recent diagnosis of HCC, s/p resection of the hepatic mass with Dr. Werner on 6/11/18.  + SOB and PATEL, still present but improved. + Multiple joint pain, knees, back etc.  No other complaints.  No abdominal pain. No fevers, no chills, no night sweats.  No N/V/D/C. No BRBPR.

## 2019-02-15 NOTE — RESULTS/DATA
[FreeTextEntry1] : 11/8/18 MRI Abdomen: Cirrhosis with evidence of portal hypertension. Patent hepatic vasculature. No evidence of hepatocellular carcinoma.\par \par 8/30/18 CT Abdomen: Stable mild cirrhotic changes in the hepatic parenchyma and grossly stable postsurgical changes in the area of the hepatic lesion resections described. Correlate and follow-up as felt clinically appropriate. Stable exam otherwise. No abnormality seen subadjacent to the sot tissue marker in area of claimed palpable density right upper quadrant abdominal wall.\par \par 07/31/18 CT chest:  Indeterminate 5 mm subpleural nodule in the right lower lobe superior segment.  This was not present on 2008 scan.\par \par 07/31/18 MRI abdomen:  At the site of the previously noted enhancing lesion in the dome, there is an ill-defined small focus without enhancement consistent with sequelae of recent surgery.  The liver remains enlarged, fatty and cirrhotic.  No enhancing or otherwise suspicious lesion is noted at this time and there is no defect on hepatobiliary phase.   Previously noted small nodules are not conspicuous.   No evidence of residual or recurrent lesion.\par \par 6/11/18 Pathology:  Liver nodule/mass surgical, wedge biopsy:  Hepatocellular carcinoma, moderately differentiated in background of cirrhosis.\par note: special stains performed reticulin trichrome, PAS and D-PAS.\par \par 4/18/18 MRI Abdomen:  Cirrhosis.  Hepatic steatosis. Indeterminant nodule in the hepatic  dome which is hypointense on hepatobiliary phase,  measuring 1.8 cm and demonstrates arterial enhancement.  Splenomegaly with cysts.  \par \par 3/28/18 MRI Abdomen/Adrenal/Liver:  the liver is enlarged, 25 cm, and fatty. The liver surface is finely nodular, and the pancreatic parenchyma is finely nodular, consistent with cirrhosis. In the dome, there is a 2 x 1 cm arterially hyper enhancing nodule which remains hyper enhanced on delayed post contrast imaging, without wash out, and essentially hyperintense on suppressing T2-weighted imaging. There are several other small arterial hyperenhancing foci measuring 9 mm or less that are occult on all other sequences. The spleen is enlarged, 19 cm, and contains scattered subcentimeter cysts. They're slightly enlarged celiac and periportal lymph nodes, common in the viral hepatitis. No suspicious lymphadenopathy noted.\par \par 2/13/18 U/S Abdomen:  the liver is enlarged measuring 18.5 cm in length. There is diffuse increased echogenicity to liver parenchyma most compatible with fatty infiltration versus diffuse hepatocellular disease. No focal hepatic lesions are noted. Enlarged spleen measuring 20.1 cm in length.  No focal splenic lesions.

## 2019-02-15 NOTE — PHYSICAL EXAM
[de-identified] : abdominal incision healing well with a focal area approximately 1 cm that is oozing clear fluid.

## 2019-03-27 NOTE — H&P PST ADULT - GENITOURINARY
pt states he drank Clorox in error- states he drank about 5-6 ounces-c/o throat burning and nausea details…

## 2019-04-12 ENCOUNTER — OUTPATIENT (OUTPATIENT)
Dept: OUTPATIENT SERVICES | Facility: HOSPITAL | Age: 69
LOS: 1 days | Discharge: HOME | End: 2019-04-12

## 2019-04-12 DIAGNOSIS — Z98.890 OTHER SPECIFIED POSTPROCEDURAL STATES: Chronic | ICD-10-CM

## 2019-04-12 DIAGNOSIS — D64.9 ANEMIA, UNSPECIFIED: ICD-10-CM

## 2019-04-17 ENCOUNTER — APPOINTMENT (OUTPATIENT)
Dept: HEMATOLOGY ONCOLOGY | Facility: CLINIC | Age: 69
End: 2019-04-17
Payer: COMMERCIAL

## 2019-04-17 VITALS
TEMPERATURE: 98.1 F | WEIGHT: 305 LBS | BODY MASS INDEX: 43.67 KG/M2 | OXYGEN SATURATION: 95 % | DIASTOLIC BLOOD PRESSURE: 77 MMHG | HEART RATE: 89 BPM | SYSTOLIC BLOOD PRESSURE: 122 MMHG | HEIGHT: 70 IN

## 2019-04-17 PROCEDURE — 99214 OFFICE O/P EST MOD 30 MIN: CPT

## 2019-05-21 NOTE — HISTORY OF PRESENT ILLNESS
[T: ___] : T[unfilled] [M: ___] : M[unfilled] [AJCC Stage: ____] : AJCC Stage: [unfilled] [de-identified] : The patient was diagnosed with hepatocellular carcinoma in June 2018 at the age of 67.  Patient follows with GI, Dr. Jluis Ng, due to his h/o Hep C.  He had a routine abdominal U/S that showed diffuse increased echogenicity to liver parenchyma most compatible with fatty infiltration versus diffuse hepatocellular disease.  This was followed by an abdominal MRI that stated in the dome of the liver, there is a 2 x 1 cm arterially hyper enhancing nodule which remains hyper enhanced on delayed post contrast imaging, without wash out.  He was referred to Dr. Werner.  A repeat MRI abdomen showed cirrhosis and hepatic steatosis. Indeterminant nodule in the hepatic  dome which is hypointense on hepatobiliary phase,  measuring 1.8 cm and demonstrates arterial enhancement with an intermediate probability for HCC (LIRADS category III).  On 6/11/18, Dr. Werner performed an open resection of the hepatic mass. Final pathology was consistent with hepatocellular carcinoma in a background of cirrhosis.  [de-identified] : Patient presents for follow up regarding diagnosis of HCC, s/p resection of the hepatic mass with Dr. Werner on 6/11/18. +RUQ pain and increased size of RUQ post surgical hernia. + SOB and PATEL, improving.  No recent wheezing or coughing + Multiple joint pain, knees, back mostly. No other complaints. No fevers, no chills, no night sweats.  No N/V/D/C. No BRBPR.  No abnormal bruising or bleeding.

## 2019-05-21 NOTE — PHYSICAL EXAM
[Restricted in physically strenuous activity but ambulatory and able to carry out work of a light or sedentary nature] : Status 1- Restricted in physically strenuous activity but ambulatory and able to carry out work of a light or sedentary nature, e.g., light house work, office work [Normal] : affect appropriate [de-identified] : abdominal incision healing well with a focal area approximately 1 cm that is oozing clear fluid.

## 2019-05-21 NOTE — REVIEW OF SYSTEMS
[Fatigue] : fatigue [Dry Eyes] : dryness of the eyes [Vision Problems] : vision problems [Shortness Of Breath] : shortness of breath [SOB on Exertion] : shortness of breath during exertion [Abdominal Pain] : abdominal pain [Joint Pain] : joint pain [Skin Wound] : skin wound [Confused] : confusion [Difficulty Walking] : difficulty walking [Anxiety] : anxiety [Depression] : depression [Negative] : Allergic/Immunologic [FreeTextEntry8] : hesitancy

## 2019-08-14 ENCOUNTER — APPOINTMENT (OUTPATIENT)
Dept: SURGICAL ONCOLOGY | Facility: CLINIC | Age: 69
End: 2019-08-14
Payer: COMMERCIAL

## 2019-08-14 VITALS
DIASTOLIC BLOOD PRESSURE: 75 MMHG | SYSTOLIC BLOOD PRESSURE: 124 MMHG | HEIGHT: 70 IN | HEART RATE: 88 BPM | WEIGHT: 296 LBS | BODY MASS INDEX: 42.37 KG/M2

## 2019-08-14 PROCEDURE — 99214 OFFICE O/P EST MOD 30 MIN: CPT

## 2019-08-14 NOTE — PHYSICAL EXAM
[Normal] : supple, no neck mass and thyroid not enlarged [Normal Neck Lymph Nodes] : normal neck lymph nodes  [Normal Supraclavicular Lymph Nodes] : normal supraclavicular lymph nodes [Normal] : oriented to person, place and time, with appropriate affect [de-identified] : Abdominal incision healed with large right-sided partially reducible incisional hernia.
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
## 2019-08-14 NOTE — ASSESSMENT
[FreeTextEntry1] : History of hepatocellular carcinoma- LIBERTAD\par Completed Harvoni for hepatitis C\par Significant. diastasis recti\par Will schedule repair when patient is down to 250 lbs\par Patient should wear abdominal binder for now

## 2019-10-12 ENCOUNTER — OUTPATIENT (OUTPATIENT)
Dept: OUTPATIENT SERVICES | Facility: HOSPITAL | Age: 69
LOS: 1 days | Discharge: HOME | End: 2019-10-12

## 2019-10-12 DIAGNOSIS — D64.9 ANEMIA, UNSPECIFIED: ICD-10-CM

## 2019-10-12 DIAGNOSIS — Z98.890 OTHER SPECIFIED POSTPROCEDURAL STATES: Chronic | ICD-10-CM

## 2019-10-15 ENCOUNTER — OTHER (OUTPATIENT)
Age: 69
End: 2019-10-15

## 2019-10-16 ENCOUNTER — APPOINTMENT (OUTPATIENT)
Age: 69
End: 2019-10-16
Payer: COMMERCIAL

## 2019-10-16 VITALS
BODY MASS INDEX: 43.67 KG/M2 | DIASTOLIC BLOOD PRESSURE: 72 MMHG | WEIGHT: 305 LBS | HEART RATE: 89 BPM | SYSTOLIC BLOOD PRESSURE: 121 MMHG | HEIGHT: 70 IN | TEMPERATURE: 98.2 F | OXYGEN SATURATION: 98 %

## 2019-10-16 PROCEDURE — 99214 OFFICE O/P EST MOD 30 MIN: CPT

## 2019-10-16 NOTE — HISTORY OF PRESENT ILLNESS
[T: ___] : T[unfilled] [M: ___] : M[unfilled] [AJCC Stage: ____] : AJCC Stage: [unfilled] [de-identified] : The patient was diagnosed with hepatocellular carcinoma in June 2018 at the age of 67.  Patient follows with GI, Dr. Jluis Ng, due to his h/o Hep C.  He had a routine abdominal U/S that showed diffuse increased echogenicity to liver parenchyma most compatible with fatty infiltration versus diffuse hepatocellular disease.  This was followed by an abdominal MRI that stated in the dome of the liver, there is a 2 x 1 cm arterially hyper enhancing nodule which remains hyper enhanced on delayed post contrast imaging, without wash out.  He was referred to Dr. Werner.  A repeat MRI abdomen showed cirrhosis and hepatic steatosis. Indeterminant nodule in the hepatic  dome which is hypointense on hepatobiliary phase,  measuring 1.8 cm and demonstrates arterial enhancement with an intermediate probability for HCC (LIRADS category III).  On 6/11/18, Dr. Werner performed an open resection of the hepatic mass. Final pathology was consistent with hepatocellular carcinoma in a background of cirrhosis.  [de-identified] : Patient presents for follow up regarding diagnosis of HCC, s/p resection of the hepatic mass with Dr. Werner on 6/11/18. + RUQ pain and increased size of RUQ post surgical hernia. + Weight loss. No recent wheezing or coughing + Multiple joint pain, knees, back mostly. No other complaints. No fevers, no chills, no night sweats.  No N/V/D/C. No BRBPR.  No abnormal bruising or bleeding. No other complaints today.

## 2019-10-16 NOTE — PHYSICAL EXAM
[Restricted in physically strenuous activity but ambulatory and able to carry out work of a light or sedentary nature] : Status 1- Restricted in physically strenuous activity but ambulatory and able to carry out work of a light or sedentary nature, e.g., light house work, office work [Normal] : affect appropriate [de-identified] : abdominal incision healing well with a focal area approximately 1 cm that is oozing clear fluid.

## 2019-10-16 NOTE — RESULTS/DATA
[FreeTextEntry1] : 3/19/19 MRI Adrenal/Liver: Cirrhosis with evidence of portal hypertension. Patent hepatic vasculature. No evidence of hepatocellular carcinoma.\par \par 11/8/18 MRI Abdomen: Cirrhosis with evidence of portal hypertension. Patent hepatic vasculature. No evidence of hepatocellular carcinoma.\par \par 8/30/18 CT Abdomen: Stable mild cirrhotic changes in the hepatic parenchyma and grossly stable postsurgical changes in the area of the hepatic lesion resections described. Correlate and follow-up as felt clinically appropriate. Stable exam otherwise. No abnormality seen subadjacent to the sot tissue marker in area of claimed palpable density right upper quadrant abdominal wall.\par \par 07/31/18 CT chest:  Indeterminate 5 mm subpleural nodule in the right lower lobe superior segment.  This was not present on 2008 scan.\par \par 07/31/18 MRI abdomen:  At the site of the previously noted enhancing lesion in the dome, there is an ill-defined small focus without enhancement consistent with sequelae of recent surgery.  The liver remains enlarged, fatty and cirrhotic.  No enhancing or otherwise suspicious lesion is noted at this time and there is no defect on hepatobiliary phase.   Previously noted small nodules are not conspicuous.   No evidence of residual or recurrent lesion.\par \par 6/11/18 Pathology:  Liver nodule/mass surgical, wedge biopsy:  Hepatocellular carcinoma, moderately differentiated in background of cirrhosis.\par note: special stains performed reticulin trichrome, PAS and D-PAS.\par \par 4/18/18 MRI Abdomen:  Cirrhosis.  Hepatic steatosis. Indeterminant nodule in the hepatic  dome which is hypointense on hepatobiliary phase,  measuring 1.8 cm and demonstrates arterial enhancement.  Splenomegaly with cysts.  \par \par 3/28/18 MRI Abdomen/Adrenal/Liver:  the liver is enlarged, 25 cm, and fatty. The liver surface is finely nodular, and the pancreatic parenchyma is finely nodular, consistent with cirrhosis. In the dome, there is a 2 x 1 cm arterially hyper enhancing nodule which remains hyper enhanced on delayed post contrast imaging, without wash out, and essentially hyperintense on suppressing T2-weighted imaging. There are several other small arterial hyperenhancing foci measuring 9 mm or less that are occult on all other sequences. The spleen is enlarged, 19 cm, and contains scattered subcentimeter cysts. They're slightly enlarged celiac and periportal lymph nodes, common in the viral hepatitis. No suspicious lymphadenopathy noted.\par \par 2/13/18 U/S Abdomen:  the liver is enlarged measuring 18.5 cm in length. There is diffuse increased echogenicity to liver parenchyma most compatible with fatty infiltration versus diffuse hepatocellular disease. No focal hepatic lesions are noted. Enlarged spleen measuring 20.1 cm in length.  No focal splenic lesions.

## 2019-10-30 ENCOUNTER — APPOINTMENT (OUTPATIENT)
Dept: SURGICAL ONCOLOGY | Facility: CLINIC | Age: 69
End: 2019-10-30
Payer: COMMERCIAL

## 2019-10-30 VITALS
WEIGHT: 312 LBS | DIASTOLIC BLOOD PRESSURE: 74 MMHG | HEART RATE: 101 BPM | SYSTOLIC BLOOD PRESSURE: 129 MMHG | HEIGHT: 70 IN | BODY MASS INDEX: 44.67 KG/M2

## 2019-10-30 PROCEDURE — 99214 OFFICE O/P EST MOD 30 MIN: CPT
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
## 2019-10-30 NOTE — PHYSICAL EXAM
[Normal] : supple, no neck mass and thyroid not enlarged [Normal Neck Lymph Nodes] : normal neck lymph nodes  [Normal Supraclavicular Lymph Nodes] : normal supraclavicular lymph nodes [Normal] : oriented to person, place and time, with appropriate affect [de-identified] : Abdominal incision healed with large right-sided partially reducible incisional hernia.
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
## 2019-11-01 PROBLEM — R19.8 FULLNESS OF ABDOMEN: Status: ACTIVE | Noted: 2019-04-17

## 2019-11-01 NOTE — HISTORY OF PRESENT ILLNESS
[T: ___] : T[unfilled] [M: ___] : M[unfilled] [AJCC Stage: ____] : AJCC Stage: [unfilled] [de-identified] : The patient was diagnosed with hepatocellular carcinoma in June 2018 at the age of 67.  Patient follows with GI, Dr. Jluis Ng, due to his h/o Hep C.  He had a routine abdominal U/S that showed diffuse increased echogenicity to liver parenchyma most compatible with fatty infiltration versus diffuse hepatocellular disease.  This was followed by an abdominal MRI that stated in the dome of the liver, there is a 2 x 1 cm arterially hyper enhancing nodule which remains hyper enhanced on delayed post contrast imaging, without wash out.  He was referred to Dr. Werner.  A repeat MRI abdomen showed cirrhosis and hepatic steatosis. Indeterminant nodule in the hepatic  dome which is hypointense on hepatobiliary phase,  measuring 1.8 cm and demonstrates arterial enhancement with an intermediate probability for HCC (LIRADS category III).  On 6/11/18, Dr. Werner performed an open resection of the hepatic mass. Final pathology was consistent with hepatocellular carcinoma in a background of cirrhosis.  [de-identified] : Patient presents for follow up regarding diagnosis of HCC, s/p resection of the hepatic mass with Dr. Werner on 6/11/18. + RUQ pain and increased size of RUQ post surgical hernia. + Weight loss. No recent wheezing or coughing + Multiple joint pain, knees, back mostly. No other complaints. No fevers, no chills, no night sweats.  No N/V/D/C. No BRBPR.  No abnormal bruising or bleeding. No other complaints today.

## 2019-11-01 NOTE — PHYSICAL EXAM
[Restricted in physically strenuous activity but ambulatory and able to carry out work of a light or sedentary nature] : Status 1- Restricted in physically strenuous activity but ambulatory and able to carry out work of a light or sedentary nature, e.g., light house work, office work [Normal] : affect appropriate [de-identified] : abdominal incision healing well with a focal area approximately 1 cm that is oozing clear fluid.

## 2019-11-05 ENCOUNTER — APPOINTMENT (OUTPATIENT)
Dept: HEMATOLOGY ONCOLOGY | Facility: CLINIC | Age: 69
End: 2019-11-05

## 2019-11-05 DIAGNOSIS — R19.8 OTHER SPECIFIED SYMPTOMS AND SIGNS INVOLVING THE DIGESTIVE SYSTEM AND ABDOMEN: ICD-10-CM

## 2019-12-05 ENCOUNTER — FORM ENCOUNTER (OUTPATIENT)
Age: 69
End: 2019-12-05

## 2019-12-06 ENCOUNTER — OUTPATIENT (OUTPATIENT)
Dept: OUTPATIENT SERVICES | Facility: HOSPITAL | Age: 69
LOS: 1 days | End: 2019-12-06

## 2019-12-06 ENCOUNTER — APPOINTMENT (OUTPATIENT)
Dept: MRI IMAGING | Facility: CLINIC | Age: 69
End: 2019-12-06
Payer: COMMERCIAL

## 2019-12-06 DIAGNOSIS — Z98.890 OTHER SPECIFIED POSTPROCEDURAL STATES: Chronic | ICD-10-CM

## 2019-12-06 DIAGNOSIS — C22.0 LIVER CELL CARCINOMA: ICD-10-CM

## 2019-12-06 PROCEDURE — 74183 MRI ABD W/O CNTR FLWD CNTR: CPT | Mod: 26

## 2020-06-04 ENCOUNTER — OUTPATIENT (OUTPATIENT)
Dept: OUTPATIENT SERVICES | Facility: HOSPITAL | Age: 70
LOS: 1 days | Discharge: HOME | End: 2020-06-04

## 2020-06-04 DIAGNOSIS — D64.9 ANEMIA, UNSPECIFIED: ICD-10-CM

## 2020-06-04 DIAGNOSIS — Z98.890 OTHER SPECIFIED POSTPROCEDURAL STATES: Chronic | ICD-10-CM

## 2020-06-10 ENCOUNTER — APPOINTMENT (OUTPATIENT)
Dept: HEMATOLOGY ONCOLOGY | Facility: CLINIC | Age: 70
End: 2020-06-10

## 2020-06-30 ENCOUNTER — OUTPATIENT (OUTPATIENT)
Dept: OUTPATIENT SERVICES | Facility: HOSPITAL | Age: 70
LOS: 1 days | Discharge: HOME | End: 2020-06-30

## 2020-06-30 DIAGNOSIS — Z98.890 OTHER SPECIFIED POSTPROCEDURAL STATES: Chronic | ICD-10-CM

## 2020-06-30 DIAGNOSIS — D64.9 ANEMIA, UNSPECIFIED: ICD-10-CM

## 2020-07-07 ENCOUNTER — APPOINTMENT (OUTPATIENT)
Dept: HEMATOLOGY ONCOLOGY | Facility: CLINIC | Age: 70
End: 2020-07-07

## 2020-07-07 DIAGNOSIS — C22.0 LIVER CELL CARCINOMA: ICD-10-CM

## 2020-07-07 DIAGNOSIS — R06.02 SHORTNESS OF BREATH: ICD-10-CM

## 2020-07-07 NOTE — HISTORY OF PRESENT ILLNESS
[Home] : at home, [unfilled] , at the time of the visit. [Medical Office: ([facility])___] : at the medical office located in  [Verbal consent obtained from patient] : the patient, [unfilled] [M: ___] : M[unfilled] [T: ___] : T[unfilled] [AJCC Stage: ____] : AJCC Stage: [unfilled] [de-identified] : The patient was diagnosed with hepatocellular carcinoma in June 2018 at the age of 67.  Patient follows with GI, Dr. Jluis Ng, due to his h/o Hep C.  He had a routine abdominal U/S that showed diffuse increased echogenicity to liver parenchyma most compatible with fatty infiltration versus diffuse hepatocellular disease.  This was followed by an abdominal MRI that stated in the dome of the liver, there is a 2 x 1 cm arterially hyper enhancing nodule which remains hyper enhanced on delayed post contrast imaging, without wash out.  He was referred to Dr. Werner.  A repeat MRI abdomen showed cirrhosis and hepatic steatosis. Indeterminant nodule in the hepatic  dome which is hypointense on hepatobiliary phase,  measuring 1.8 cm and demonstrates arterial enhancement with an intermediate probability for HCC (LIRADS category III).  On 6/11/18, Dr. Werner performed an open resection of the hepatic mass. Final pathology was consistent with hepatocellular carcinoma in a background of cirrhosis.  [de-identified] : Patient presents for follow up regarding diagnosis of HCC, s/p resection of the hepatic mass with Dr. Werner on 6/11/18. + RUQ pain and increased size of RUQ post surgical hernia. + Weight loss. No recent wheezing or coughing + Multiple joint pain, knees, back mostly. No other complaints. No fevers, no chills, no night sweats.  No N/V/D/C. No BRBPR.  No abnormal bruising or bleeding. No other complaints today.

## 2020-07-07 NOTE — REVIEW OF SYSTEMS
[Fatigue] : fatigue [Dry Eyes] : dryness of the eyes [Vision Problems] : vision problems [Shortness Of Breath] : shortness of breath [SOB on Exertion] : shortness of breath during exertion [Joint Pain] : joint pain [Abdominal Pain] : abdominal pain [Skin Wound] : skin wound [Confused] : confusion [Difficulty Walking] : difficulty walking [Depression] : depression [Anxiety] : anxiety [Negative] : Allergic/Immunologic [[email or web address]] : hesitancy

## 2020-07-07 NOTE — PHYSICAL EXAM
[Restricted in physically strenuous activity but ambulatory and able to carry out work of a light or sedentary nature] : Status 1- Restricted in physically strenuous activity but ambulatory and able to carry out work of a light or sedentary nature, e.g., light house work, office work [Normal] : grossly intact [de-identified] : abdominal incision healing well with a focal area approximately 1 cm that is oozing clear fluid.

## 2020-08-02 DIAGNOSIS — Z01.818 ENCOUNTER FOR OTHER PREPROCEDURAL EXAMINATION: ICD-10-CM

## 2020-08-03 ENCOUNTER — APPOINTMENT (OUTPATIENT)
Dept: DISASTER EMERGENCY | Facility: CLINIC | Age: 70
End: 2020-08-03

## 2020-08-04 LAB — SARS-COV-2 N GENE NPH QL NAA+PROBE: NOT DETECTED

## 2020-08-07 ENCOUNTER — OUTPATIENT (OUTPATIENT)
Dept: OUTPATIENT SERVICES | Facility: HOSPITAL | Age: 70
LOS: 1 days | End: 2020-08-07

## 2020-08-07 ENCOUNTER — APPOINTMENT (OUTPATIENT)
Dept: MRI IMAGING | Facility: CLINIC | Age: 70
End: 2020-08-07
Payer: COMMERCIAL

## 2020-08-07 DIAGNOSIS — Z98.890 OTHER SPECIFIED POSTPROCEDURAL STATES: Chronic | ICD-10-CM

## 2020-08-07 DIAGNOSIS — Z00.8 ENCOUNTER FOR OTHER GENERAL EXAMINATION: ICD-10-CM

## 2020-08-07 PROCEDURE — 70551 MRI BRAIN STEM W/O DYE: CPT | Mod: 26

## 2021-09-23 NOTE — CONSULT NOTE ADULT - CONSULT REASON
Patient requesting prescription for Prednisone for migraines.    Prednisone not on current med list.  Last filled for use with migraines 10/13/20, 12 tablets.  Prescribed Sumatriptan, last filled 12/26/17.    Please advise.  
DM type 2 on u500 pump
Universal Safety Interventions
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
## 2025-04-03 NOTE — ASU PREOP CHECKLIST - AICD PRESENT
Left VM for patient to convey below message.         ----- Message from BRANDO Harris sent at 4/3/2025  9:38 AM CDT -----  TB testing is negative.  
no